# Patient Record
Sex: MALE | Race: WHITE | NOT HISPANIC OR LATINO | Employment: FULL TIME | URBAN - METROPOLITAN AREA
[De-identification: names, ages, dates, MRNs, and addresses within clinical notes are randomized per-mention and may not be internally consistent; named-entity substitution may affect disease eponyms.]

---

## 2018-10-17 ENCOUNTER — TELEPHONE (OUTPATIENT)
Dept: FAMILY MEDICINE | Facility: CLINIC | Age: 33
End: 2018-10-17

## 2018-10-17 NOTE — TELEPHONE ENCOUNTER
Doug Johnson is a 33 year old male who calls with pink eye concerns .    NURSING ASSESSMENT:  Description:  Works out side and eyes feel scratchy. Eyes are watery. Denies eye discharge, pinkness in the eyes, burning in eyes, fever, swelling.   Onset/duration:  This week  Precip. factors:  7.5 month old infant has pink eye, drops started Monday. Wife has pink eye.  Associated symptoms:  Eye concerns  Improves/worsens symptoms:  NEW  Pain scale (0-10)   0/10  Last exam/Treatment:  NEW  Allergies:   Allergies   Allergen Reactions     Penicillins      NURSING PLAN: Nursing advice to patient home care measures to prevent pink eye     RECOMMENDED DISPOSITION:  Home care advice   Will comply with recommendation: Yes  If further questions/concerns or if symptoms do not improve, worsen or new symptoms develop, call your PCP or [facility] as soon as possible.    NOTES:  Disposition was determined by the first positive assessment question, therefore all previous assessment questions were negative    Guideline used:  Telephone Triage Protocols for Nurses, Fifth Edition, Shanna Morocho  Eye problems  Nursing Judgment    Cecilia Espino, RN, BSN

## 2018-10-17 NOTE — TELEPHONE ENCOUNTER
Reason for Call:  Other call back    Detailed comments: Patient son was seen and dx with pink eye, he is wondering if he should get checked,  He has no symptoms.  Please call    Phone Number Patient can be reached at: Home number on file [phone] (home)    Best Time: any    Can we leave a detailed message on this number? YES    Call taken on 10/17/2018 at 10:40 AM by Caroline Geiger

## 2019-08-30 NOTE — PROGRESS NOTES
"SUBJECTIVE:   CC: Doug Johnson is an 34 year old male who presents for preventative health visit.     Healthy Habits:     Getting at least 3 servings of Calcium per day:  NO    Bi-annual eye exam:  Yes    Dental care twice a year:  Yes    Sleep apnea or symptoms of sleep apnea:  Sleep apnea    Diet:  Regular (no restrictions)    Frequency of exercise:  2-3 days/week    Duration of exercise:  15-30 minutes    Taking medications regularly:  Yes    Medication side effects:  Muscle aches    PHQ-2 Total Score: 1    Additional concerns today:  No    Biometric screening form needs to filled out- patient is Fasting today.     Borderline blood pressure   Has taken medications in the past- last a few years ago  Not watching salt/sodum in the diet  Exercise- \"chasing toddler around\"; landscaping up until new job. Runs 5k and has done marathons in the past but gotten out of good habits with young family.   Smoking- no tobacco. Marijuana \"occasionally\"  Father w/abdominal aortic aneurysm at age 65- emergency surgery. He smoked and had HTN. Paternal gma also had AAA.     Wife Shannon Johnson is my patient from [facility].   They have son Dieudonne and new baby girl Bertha borne over the weekend.   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     Hx of treatment for anxiety  Has taken medications- not for me.    Has done counseling  Attributes to stressful work environment. Changed his job.  Trying to get back to running.   Some mood sx- related to the job.   He reports he smokes marijuana now and again for stress. \"I know its your job to tell me not to but I could get a card if I wanted\"    Routine Health Maintenance:  Fasting: yes  Immunizations: wants tetanus and flu shot  Colonoscopy: Fam hx of colon cancer: no  PSA screening:  Fam hx of prostate cancer: no  Urinary concerns: no  Sexually active: yes- , monogamous. STD screening: not concerned. Will do HIV testing.        Today's PHQ-2 Score:   PHQ-2 " ( 1999 Pfizer) 9/4/2019   Q1: Little interest or pleasure in doing things 0   Q2: Feeling down, depressed or hopeless 1   PHQ-2 Score 1   Q1: Little interest or pleasure in doing things Not at all   Q2: Feeling down, depressed or hopeless Several days   PHQ-2 Score 1       Abuse: Current or Past(Physical, Sexual or Emotional)- No  Do you feel safe in your environment? Yes    Social History     Tobacco Use     Smoking status: Former Smoker     Packs/day: 0.50     Years: 5.00     Pack years: 2.50     Smokeless tobacco: Never Used   Substance Use Topics     Alcohol use: No     If you drink alcohol do you typically have >3 drinks per day or >7 drinks per week? No    No flowsheet data found.    Last PSA: No results found for: PSA    Reviewed orders with patient. Reviewed health maintenance and updated orders accordingly - Yes  Lab work is in process  Labs reviewed in EPIC  BP Readings from Last 3 Encounters:   09/04/19 135/85   04/02/12 129/82   12/26/11 131/86    Wt Readings from Last 3 Encounters:   09/04/19 93.9 kg (207 lb)   04/02/12 94.8 kg (209 lb)   12/26/11 92.9 kg (204 lb 12.8 oz)                  Patient Active Problem List   Diagnosis     CARDIOVASCULAR SCREENING; LDL GOAL LESS THAN 160     Family history of aortic aneurysm     History reviewed. No pertinent surgical history.    Social History     Tobacco Use     Smoking status: Former Smoker     Packs/day: 0.50     Years: 5.00     Pack years: 2.50     Smokeless tobacco: Never Used   Substance Use Topics     Alcohol use: No     Family History   Problem Relation Age of Onset     Abdominal Aortic Aneurysm Father 64     Abdominal Aortic Aneurysm Paternal Grandmother          Current Outpatient Medications   Medication Sig Dispense Refill     NO ACTIVE MEDICATIONS        Allergies   Allergen Reactions     Penicillins        Reviewed and updated as needed this visit by clinical staff         Reviewed and updated as needed this visit by Provider            Review of  "Systems   Constitutional: Negative for chills and fever.   HENT: Negative for congestion, ear pain, hearing loss and sore throat.    Eyes: Negative for pain and visual disturbance.   Respiratory: Negative for cough and shortness of breath.    Cardiovascular: Negative for chest pain, palpitations and peripheral edema.   Gastrointestinal: Negative for abdominal pain, constipation, diarrhea, heartburn, hematochezia and nausea.   Genitourinary: Negative for discharge, dysuria, frequency, genital sores, hematuria, impotence and urgency.   Musculoskeletal: Positive for myalgias. Negative for arthralgias and joint swelling.   Skin: Negative for rash.   Neurological: Negative for dizziness, weakness, headaches and paresthesias.   Psychiatric/Behavioral: Negative for mood changes. The patient is nervous/anxious.        OBJECTIVE:   /85   Pulse 74   Temp 98  F (36.7  C) (Oral)   Resp 12   Ht 1.778 m (5' 10\")   Wt 93.9 kg (207 lb)   BMI 29.70 kg/m    Physical Exam  GENERAL: healthy, alert and no distress  EYES: Eyes grossly normal to inspection, PERRL and conjunctivae and sclerae normal  HENT: ear canals and TM's normal, nose and mouth without ulcers or lesions  NECK: no adenopathy, no asymmetry, masses, or scars and thyroid normal to palpation  RESP: lungs clear to auscultation - no rales, rhonchi or wheezes  CV: regular rate and rhythm, normal S1 S2, no S3 or S4, no murmur, click or rub, no peripheral edema and peripheral pulses strong  ABDOMEN: soft, nontender, no hepatosplenomegaly, no masses and bowel sounds normal   (male): normal male genitalia without lesions or urethral discharge, no hernia  MS: no gross musculoskeletal defects noted, no edema  SKIN: no suspicious lesions or rashes  NEURO: Normal strength and tone, mentation intact and speech normal  PSYCH: mentation appears normal, affect normal/bright  LYMPH: normal ant/post cervical, supraclavicular nodes    Diagnostic Test Results:  Labs reviewed in " "Epic  Results for orders placed or performed in visit on 09/04/19 (from the past 24 hour(s))   Lipid panel reflex to direct LDL Fasting   Result Value Ref Range    Cholesterol 179 <200 mg/dL    Triglycerides 136 <150 mg/dL    HDL Cholesterol 45 >39 mg/dL    LDL Cholesterol Calculated 107 (H) <100 mg/dL    Non HDL Cholesterol 134 (H) <130 mg/dL   Glucose   Result Value Ref Range    Glucose 85 70 - 99 mg/dL       ASSESSMENT/PLAN:   1. Routine general medical examination at a health care facility  Fasting labs as below  Counseling as below  Pt to return for Tdap (new baby born this week) and influenza vaccine - not available in clinic today   - HIV Screening  - Lipid panel reflex to direct LDL Fasting  - Glucose    2. Encounter for biometric screening  Form to be completed when labs resulted  - Lipid panel reflex to direct LDL Fasting  - Glucose    3. Family history of aortic aneurysm  Family hx of ruptured aneurysm in father (survived) and ruptured in pat gma (fatal)  screening US  Counseled on risk reduction: discussed avoidance of tobacco and marijuana, BP management, weight management  - US Abdominal Aorta Imaging; Future    4. Elevated BP without diagnosis of hypertension  BP pre-HTN  Discussed low sodium diet  Weight management  No smoking  Recheck in 3-6mo     5. Screening for human immunodeficiency virus  Counseled on 1 time screening. Pt consented  - HIV Screening    6. Need for vaccination  Pt to return for Tdap and influenza     7. Situational stress  Counseled against marijuana use for \"stress\"  He is not interested in medication options for management  He is not interested in counseling  Discussed self cares- encouraged getting back to running which has helped him in the past  Avoid alcohol     8. Encounter for vasectomy counseling  Pt is interested in vasectomy.   He was introduced to  in clinic. May pursue with .   If he desires through urology as alternate, he can call for referral " "as well.       COUNSELING:   Reviewed preventive health counseling, as reflected in patient instructions       Regular exercise       Healthy diet/nutrition       Family planning       HIV screeninx in teen years, 1x in adult years, and at intervals if high risk    Estimated body mass index is 30.42 kg/m  as calculated from the following:    Height as of 12: 1.765 m (5' 9.5\").    Weight as of 12: 94.8 kg (209 lb).     Weight management plan: Discussed healthy diet and exercise guidelines     reports that he has quit smoking. He has never used smokeless tobacco.      Counseling Resources:  ATP IV Guidelines  Pooled Cohorts Equation Calculator  FRAX Risk Assessment  ICSI Preventive Guidelines  Dietary Guidelines for Americans, 2010  USDA's MyPlate  ASA Prophylaxis  Lung CA Screening    Shannon Oviedo PA-C  [facility]  "

## 2019-08-30 NOTE — PATIENT INSTRUCTIONS
Preventive Health Recommendations  Male Ages 26 - 39    Yearly exam:             See your health care provider every year in order to  o   Review health changes.   o   Discuss preventive care.    o   Review your medicines if your doctor has prescribed any.    You should be tested each year for STDs (sexually transmitted diseases), if you re at risk.     After age 35, talk to your provider about cholesterol testing. If you are at risk for heart disease, have your cholesterol tested at least every 5 years.     If you are at risk for diabetes, you should have a diabetes test (fasting glucose).  Shots: Get a flu shot each year. Get a tetanus shot every 10 years.     Nutrition:    Eat at least 5 servings of fruits and vegetables daily.     Eat whole-grain bread, whole-wheat pasta and brown rice instead of white grains and rice.     Get adequate Calcium and Vitamin D.     Lifestyle    Exercise for at least 150 minutes a week (30 minutes a day, 5 days a week). This will help you control your weight and prevent disease.     Limit alcohol to one drink per day.     No smoking.     Wear sunscreen to prevent skin cancer.     See your dentist every six months for an exam and cleaning.   ----  Dr.Shawn Chirinos - here at [facility] does vasectomy procedures. Can schedule an office visit with him to discuss the procedure.   To schedule your test or specialty referral please call: abdominal US  [facility]:  Radiology (imaging- mammogram, ultrasound, CT, MRI): [phone] [address]  [facility] [address]

## 2019-09-04 ENCOUNTER — OFFICE VISIT (OUTPATIENT)
Dept: FAMILY MEDICINE | Facility: CLINIC | Age: 34
End: 2019-09-04
Payer: COMMERCIAL

## 2019-09-04 ENCOUNTER — TELEPHONE (OUTPATIENT)
Dept: FAMILY MEDICINE | Facility: CLINIC | Age: 34
End: 2019-09-04

## 2019-09-04 VITALS
BODY MASS INDEX: 29.63 KG/M2 | SYSTOLIC BLOOD PRESSURE: 135 MMHG | RESPIRATION RATE: 12 BRPM | HEIGHT: 70 IN | DIASTOLIC BLOOD PRESSURE: 85 MMHG | HEART RATE: 74 BPM | WEIGHT: 207 LBS | TEMPERATURE: 98 F

## 2019-09-04 DIAGNOSIS — R03.0 ELEVATED BP WITHOUT DIAGNOSIS OF HYPERTENSION: ICD-10-CM

## 2019-09-04 DIAGNOSIS — Z00.8 ENCOUNTER FOR BIOMETRIC SCREENING: ICD-10-CM

## 2019-09-04 DIAGNOSIS — Z30.09 ENCOUNTER FOR VASECTOMY COUNSELING: ICD-10-CM

## 2019-09-04 DIAGNOSIS — Z11.4 SCREENING FOR HUMAN IMMUNODEFICIENCY VIRUS: ICD-10-CM

## 2019-09-04 DIAGNOSIS — Z82.49 FAMILY HISTORY OF AORTIC ANEURYSM: ICD-10-CM

## 2019-09-04 DIAGNOSIS — F43.9 SITUATIONAL STRESS: ICD-10-CM

## 2019-09-04 DIAGNOSIS — Z00.00 ROUTINE GENERAL MEDICAL EXAMINATION AT A HEALTH CARE FACILITY: Primary | ICD-10-CM

## 2019-09-04 DIAGNOSIS — Z23 NEED FOR VACCINATION: ICD-10-CM

## 2019-09-04 LAB
CHOLEST SERPL-MCNC: 179 MG/DL
GLUCOSE SERPL-MCNC: 85 MG/DL (ref 70–99)
HDLC SERPL-MCNC: 45 MG/DL
LDLC SERPL CALC-MCNC: 107 MG/DL
NONHDLC SERPL-MCNC: 134 MG/DL
TRIGL SERPL-MCNC: 136 MG/DL

## 2019-09-04 PROCEDURE — 80061 LIPID PANEL: CPT | Performed by: PHYSICIAN ASSISTANT

## 2019-09-04 PROCEDURE — 82947 ASSAY GLUCOSE BLOOD QUANT: CPT | Performed by: PHYSICIAN ASSISTANT

## 2019-09-04 PROCEDURE — 87389 HIV-1 AG W/HIV-1&-2 AB AG IA: CPT | Performed by: PHYSICIAN ASSISTANT

## 2019-09-04 PROCEDURE — 99213 OFFICE O/P EST LOW 20 MIN: CPT | Mod: 25 | Performed by: PHYSICIAN ASSISTANT

## 2019-09-04 PROCEDURE — 99385 PREV VISIT NEW AGE 18-39: CPT | Performed by: PHYSICIAN ASSISTANT

## 2019-09-04 PROCEDURE — 36415 COLL VENOUS BLD VENIPUNCTURE: CPT | Performed by: PHYSICIAN ASSISTANT

## 2019-09-04 ASSESSMENT — ENCOUNTER SYMPTOMS
PALPITATIONS: 0
PARESTHESIAS: 0
MYALGIAS: 1
CONSTIPATION: 0
WEAKNESS: 0
DIARRHEA: 0
ARTHRALGIAS: 0
FREQUENCY: 0
JOINT SWELLING: 0
HEMATOCHEZIA: 0
SORE THROAT: 0
DIZZINESS: 0
HEADACHES: 0
SHORTNESS OF BREATH: 0
CHILLS: 0
HEMATURIA: 0
EYE PAIN: 0
ABDOMINAL PAIN: 0
NAUSEA: 0
NERVOUS/ANXIOUS: 1
FEVER: 0
HEARTBURN: 0
DYSURIA: 0
COUGH: 0

## 2019-09-04 ASSESSMENT — PAIN SCALES - GENERAL: PAINLEVEL: MODERATE PAIN (5)

## 2019-09-04 ASSESSMENT — MIFFLIN-ST. JEOR: SCORE: 1895.2

## 2019-09-04 NOTE — LETTER
"September 5, 2019      Doug Johnson  [address] [place] [address]        Dear ,    We are writing to inform you of your test results.    Your test results fall within the expected range(s) or remain unchanged from previous results.  Please continue with current treatment plan.    Blood glucose (also called blood sugar), the screen for diabetes, was normal.    Your cholesterol profile is normal.     Getting regular exercise, eating more fruits and vegetables, less red meat, less fried foods, less \"sweets\", and keeping dairy in moderation are all things that can improve these numbers and reduce cardiovascular risk.     Interpreting your Cholesterol Profile Results: The LDL is the \"bad\" cholesterol that can clog the arteries.  The HDL is protective or \"good cholesterol\"; exercise and weight management can boost this level. Triglycerides are the sugary-fats in the blood. The ratio of HDL to total cholesterol under 5.0 is optimal.          Resulted Orders   HIV Screening   Result Value Ref Range    HIV Antigen Antibody Combo Nonreactive NR^Nonreactive          Comment:      HIV-1 p24 Ag & HIV-1/HIV-2 Ab Not Detected   Lipid panel reflex to direct LDL Fasting   Result Value Ref Range    Cholesterol 179 <200 mg/dL    Triglycerides 136 <150 mg/dL    HDL Cholesterol 45 >39 mg/dL    LDL Cholesterol Calculated 107 (H) <100 mg/dL      Comment:      Above desirable:  100-129 mg/dl  Borderline High:  130-159 mg/dL  High:             160-189 mg/dL  Very high:       >189 mg/dl      Non HDL Cholesterol 134 (H) <130 mg/dL      Comment:      Above Desirable:  130-159 mg/dl  Borderline high:  160-189 mg/dl  High:             190-219 mg/dl  Very high:       >219 mg/dl     Glucose   Result Value Ref Range    Glucose 85 70 - 99 mg/dL       If you have any questions or concerns, please call the clinic at the number listed above.       Sincerely,        Shannon Oviedo PA-C                "

## 2019-09-04 NOTE — TELEPHONE ENCOUNTER
Reason for call:  Form  Reason for Call:  Form, our goal is to have forms completed with 72 hours, however, some forms may require a visit or additional information.    Type of letter, form or note:  medical    Who is the form from?:  [employer]     Where did the form come from: form was faxed in    What clinic location was the form placed at?: [facility] - [phone]    Where the form was placed: [facility]    What number is listed as a contact on the form?:          Additional comments:  Fax to [phone]    created by Sandrita Huber

## 2019-09-05 LAB — HIV 1+2 AB+HIV1 P24 AG SERPL QL IA: NONREACTIVE

## 2019-09-06 ENCOUNTER — ALLIED HEALTH/NURSE VISIT (OUTPATIENT)
Dept: FAMILY MEDICINE | Facility: CLINIC | Age: 34
End: 2019-09-06
Payer: COMMERCIAL

## 2019-09-06 DIAGNOSIS — Z23 NEED FOR VACCINATION: Primary | ICD-10-CM

## 2019-09-06 DIAGNOSIS — Z23 NEED FOR PROPHYLACTIC VACCINATION AND INOCULATION AGAINST INFLUENZA: ICD-10-CM

## 2019-09-06 PROCEDURE — 90472 IMMUNIZATION ADMIN EACH ADD: CPT

## 2019-09-06 PROCEDURE — 90715 TDAP VACCINE 7 YRS/> IM: CPT

## 2019-09-06 PROCEDURE — 90686 IIV4 VACC NO PRSV 0.5 ML IM: CPT

## 2019-09-06 PROCEDURE — 90471 IMMUNIZATION ADMIN: CPT

## 2019-09-06 PROCEDURE — 99207 ZZC NO CHARGE NURSE ONLY: CPT

## 2019-09-06 NOTE — NURSING NOTE
Prior to immunization administration, verified patients identity using patient s name and date of birth. Please see Immunization Activity for additional information.     Screening Questionnaire for Adult Immunization    Are you sick today?   No   Do you have allergies to medications, food, a vaccine component or latex?   No   Have you ever had a serious reaction after receiving a vaccination?   No   Do you have a long-term health problem with heart disease, lung disease, asthma, kidney disease, metabolic disease (e.g. diabetes), anemia, or other blood disorder?   No   Do you have cancer, leukemia, HIV/AIDS, or any other immune system problem?   No   In the past 3 months, have you taken medications that affect  your immune system, such as prednisone, other steroids, or anticancer drugs; drugs for the treatment of rheumatoid arthritis, Crohn s disease, or psoriasis; or have you had radiation treatments?   No   Have you had a seizure, or a brain or other nervous system problem?   No   During the past year, have you received a transfusion of blood or blood     products, or been given immune (gamma) globulin or antiviral drug?   No   For women: Are you pregnant or is there a chance you could become        pregnant during the next month?   No   Have you received any vaccinations in the past 4 weeks?   No     Immunization questionnaire answers were all negative.        Per orders of , injection of Flu and TDAP given by Daylin Nuñez. Patient instructed to remain in clinic for 15 minutes afterwards, and to report any adverse reaction to me immediately.

## 2019-09-21 ENCOUNTER — ANCILLARY PROCEDURE (OUTPATIENT)
Dept: ULTRASOUND IMAGING | Facility: CLINIC | Age: 34
End: 2019-09-21
Attending: PHYSICIAN ASSISTANT
Payer: COMMERCIAL

## 2019-09-21 DIAGNOSIS — Z82.49 FAMILY HISTORY OF AORTIC ANEURYSM: ICD-10-CM

## 2019-09-21 PROCEDURE — 76775 US EXAM ABDO BACK WALL LIM: CPT | Performed by: RADIOLOGY

## 2019-09-23 ENCOUNTER — TELEPHONE (OUTPATIENT)
Dept: FAMILY MEDICINE | Facility: CLINIC | Age: 34
End: 2019-09-23

## 2019-09-23 NOTE — TELEPHONE ENCOUNTER
Left message asking patient to return call.  Please inform patient of RESULTS from Provider below.           Notes recorded by Shannon Oviedo PA-C on 9/23/2019 at 8:06 AM CDT  Please contact pt with the following message:    Ultrasound of the aorta is normal. No concerns.    Shannon Oviedo PA-C

## 2019-11-13 NOTE — PROGRESS NOTES
Subjective     SUBJECTIVE:  This 34 year old male is in for a vasectomy pre-procedure counseling.  He and his partner have decided they don't want to have any more children.  Patient was given information to read prior to the consultation as well.      We talked about the risks and benefits of the vasectomy; risks being that of potential for bleeding, infection, postoperative discomfort immediately which can last for a number of weeks afterwards, also the development of spermatoceles or sperm granulomas, epididymal cysts and the possibility of failure of the procedure producing failed attempt at sterility.     We also discussed there have been some past concerns about an increased risk of testicular or prostate cancer following a vasectomy, however several large, quality studies have found no increased risk of cancer.    We discussed the procedure in detail, how it is done, bilateral No-scalpel approach.  The vas deferens is brought up through this incision, dissected free and a small portion, at least 0.5 cm. in length is removed.  The prostatic end is cauterized to 1 cm and then the proximal or distal end is buried away from the other.  Patient had no questions when it came to the procedure itself.  I did show him pictures and diagrams of the procedure.  I showed him where a potential spermatocele or sperm granuloma can occur.      We discussed the post procedure course as well.  Patient will need to rest for 72 hours, preferably supine for most of the first day.  He should apply ice four time a day.  No physical training for 1 week, no sex for 1 week.  Will then need 20 ejaculations prior to a semen analysis eight weeks after the procedure.      OBJECTIVE:  On exam, this is a well-developed, well-nourished white male.  Weight is 207 pounds.  Height is 5 foot 10.    Exam reveals a normal circumcised penis, no lesions.  Testes are descended bilaterally.  Exam was limited to the genitalia.  Both vas deferens were  "easily identified.  No other abnormalities were noted.      ASSESSMENT:  Undesired fertility in an adult male, requesting vasectomy.    PLAN:  Signed consent obtained today.  His procedure appointment will be scheduled < 30 days from now.  He will  his procedure medications of Valium (2 tabs), Percocet (3 tabs), and Ibuprofen (30 tabs) and bring those to his appointment.   He will check in 45 minutes before his procedure and take 10mg Valium a 1 percocet tablet.  We discussed the post procedure course as well.  Patient will need to rest for 72 hours, preferably supine for most of the first day.  He should apply ice four time a day.  No physical training for 1 week, no sex for 1 week.  Will then need 20 ejaculations prior to a semen analysis eight weeks after the procedure.                 Assessment & Plan     1. Encounter for vasectomy counseling  Uncomplicated preoperative counseling and exam for vasectomy.  Discussed procedure, obtained consent.  Ordered preoperative medications.  Procedure scheduled in 2 days.  - ibuprofen (ADVIL/MOTRIN) 800 MG tablet; Take 1 tablet by mouth every eight hours with food as needed for pain starting 24 hours after your vasectomy  Dispense: 30 tablet; Refill: 0  - oxyCODONE-acetaminophen (PERCOCET) 5-325 MG tablet; Take 1 tablet by mouth 45 minutes before procedure, then take 1 tablet every 6 hours as needed for pain.  Dispense: 3 tablet; Refill: 0  - Semen Analysis Post Vasectomy; Future  - diazepam (VALIUM) 10 MG tablet; Take 1 tablet (10 mg) by mouth once for 1 dose Take 1 tablet be mouth 45 minutes before vasectomy procedure  Dispense: 1 tablet; Refill: 0     BMI:   Estimated body mass index is 29.7 kg/m  as calculated from the following:    Height as of this encounter: 1.778 m (5' 10\").    Weight as of this encounter: 93.9 kg (207 lb).           Follow-up in 2 days for vasectomy    Alec Chirinos MD  [facility]    Answers for HPI/ROS submitted by the " patient on 11/20/2019   Chronic problems general questions HPI Form  How many servings of fruits and vegetables do you eat daily?: 0-1  On average, how many sweetened beverages do you drink each day (soda, juice, sweet tea, etc)?: 1  How many days per week do you miss taking your medication?: 0

## 2019-11-19 NOTE — PROGRESS NOTES
"Subjective     Doug Johnson is a 34 year old male who presents to clinic today for the following health issues:    HPI   {SUPERLIST (Optional):063088}  {additonal problems for provider to add (Optional):347006}    {HIST REVIEW/ LINKS 2 (Optional):082316}    Reviewed and updated as needed this visit by Provider         Review of Systems   {ROS COMP (Optional):018753}      Objective    There were no vitals taken for this visit.  There is no height or weight on file to calculate BMI.  Physical Exam   {Exam List (Optional):503391}    {Diagnostic Test Results (Optional):939768::\"Diagnostic Test Results:\",\"Labs reviewed in Epic\"}        {PROVIDER CHARTING PREFERENCE:518298}    "

## 2019-11-20 ENCOUNTER — OFFICE VISIT (OUTPATIENT)
Dept: FAMILY MEDICINE | Facility: CLINIC | Age: 34
End: 2019-11-20
Payer: COMMERCIAL

## 2019-11-20 VITALS
WEIGHT: 207 LBS | DIASTOLIC BLOOD PRESSURE: 88 MMHG | TEMPERATURE: 98 F | RESPIRATION RATE: 18 BRPM | HEART RATE: 68 BPM | BODY MASS INDEX: 29.63 KG/M2 | HEIGHT: 70 IN | OXYGEN SATURATION: 98 % | SYSTOLIC BLOOD PRESSURE: 118 MMHG

## 2019-11-20 DIAGNOSIS — Z30.09 ENCOUNTER FOR VASECTOMY COUNSELING: Primary | ICD-10-CM

## 2019-11-20 PROCEDURE — 99213 OFFICE O/P EST LOW 20 MIN: CPT | Performed by: FAMILY MEDICINE

## 2019-11-20 RX ORDER — DIAZEPAM 10 MG
10 TABLET ORAL ONCE
Qty: 1 TABLET | Refills: 0 | Status: SHIPPED | OUTPATIENT
Start: 2019-11-20 | End: 2019-12-24

## 2019-11-20 RX ORDER — IBUPROFEN 800 MG/1
TABLET, FILM COATED ORAL
Qty: 30 TABLET | Refills: 0 | Status: SHIPPED | OUTPATIENT
Start: 2019-11-20 | End: 2019-12-24

## 2019-11-20 RX ORDER — OXYCODONE AND ACETAMINOPHEN 5; 325 MG/1; MG/1
TABLET ORAL
Qty: 3 TABLET | Refills: 0 | Status: SHIPPED | OUTPATIENT
Start: 2019-11-20 | End: 2019-12-24

## 2019-11-20 ASSESSMENT — MIFFLIN-ST. JEOR: SCORE: 1895.2

## 2019-11-21 NOTE — PATIENT INSTRUCTIONS
Vasectomy Aftercare Instructions  Keep the incision(s) dry until the next day; then you may shower.  Keep clean gauze over the area for three (3) days.  Some bruising, drainage and swelling are not unusual.  The scrotum and penis may turn  black and blue .  The edges of the incision may pull apart, and may heal rather slowly.  A  knot  may be present on each side for several months.  This is part of the normal healing process.  Please return directly to your home, and rest for at least 24 hours.  Applying ice (frozen peas) on the scrotum for 15 minutes four times a day may help to prevent swelling.  The day after surgery, you may walk and shower.  You may increase your activity on the third day, but vigorous exercise (jogging, basketball, etc.) is not advised until a week after your procedure. No heavy lifting or very strenuous activity for ten (10) days.  Wear tight underwear or athletic supporter for increased support and comfort during the next week.  When to seek medical care: Please call [phone].  After clinic hours, please go to the Emergency Department.    Increasing pain or swelling in your scrotum    bleeding    A large black-and-blue area, or a growing lump    Fever or chills    Increasing redness or drainage of the incision    Trouble urinating  It is recommended that you refrain from having sexual intercourse and ejaculation for one (1) week.  This is to permit the cut ends of the vas deferens to close before pressure is put on them.  Do not be surprised if your ejaculate contains some blood.  It is usually old blood, and nothing to worry about.  You will not be sterile for some time after the operation because the reservoirs may still contain live sperm.  Continue to use another method of birth control until you have had a sperm count, and have received a statement that sperm are no longer present in the semen.  At eight weeks or 20 ejaculations (whichever is later), please call our clinic at  [phone] to schedule a lab only appointment to drop off your semen sample.  Abstain from ejaculation for 72 hours prior to collection.  To collect the semen, wash your hands and clean the head of your penis.  Collect the specimen through masturbation into the provided sterile specimen container.  Keep the specimen cup warmed to body temperature (store next to skin) and take the specimen to the lab within 1 hour of collection.  We will call you with the results.  If there are no sperm in your sample, you will be effectively sterile.  If you have any concerns about any possible complications or any other questions, please call at once for advice. [phone]  Remember:  use another form of contraception until you have had your semen check(s) and your doctor gives the okay that you do not need to use other contraception.

## 2019-11-22 ENCOUNTER — OFFICE VISIT (OUTPATIENT)
Dept: FAMILY MEDICINE | Facility: CLINIC | Age: 34
End: 2019-11-22
Payer: COMMERCIAL

## 2019-11-22 VITALS
OXYGEN SATURATION: 98 % | HEART RATE: 63 BPM | TEMPERATURE: 97.7 F | SYSTOLIC BLOOD PRESSURE: 134 MMHG | DIASTOLIC BLOOD PRESSURE: 87 MMHG

## 2019-11-22 DIAGNOSIS — Z30.2 ENCOUNTER FOR VASECTOMY: Primary | ICD-10-CM

## 2019-11-22 PROCEDURE — 88302 TISSUE EXAM BY PATHOLOGIST: CPT | Performed by: FAMILY MEDICINE

## 2019-11-22 PROCEDURE — 55250 REMOVAL OF SPERM DUCT(S): CPT | Performed by: FAMILY MEDICINE

## 2019-11-22 PROCEDURE — 99207 ZZC DROP WITH A PROCEDURE: CPT | Performed by: FAMILY MEDICINE

## 2019-11-22 NOTE — PROGRESS NOTES
PRE-OP DIAGNOSIS: Desires Elective Sterilization   POST-OP DIAGNOSIS: Same   PROCEDURE: Elective Bilateral Vasectomy    Physician: Dr. Alec Chirinos MD  RN: Jewels Blackwell  ANESTHESIA: Lidocaine 1% without Epi  Total amount used: 5 mL      INDICATIONS:   The patient desires elective sterilization. He was counseled regarding the risks, alternatives, and benefits of male sterilization by vasectomy. He was informed of the risks of the procedure, including but not limited to failure of the procedure to produce sterility, the risks of bleeding, infection, and injury to scrotal contents. All questions were answered in the pre-vasectomy visit and the consent form was signed within the past 30 days. A time out was taken prior to the procedure.      PROCEDURE:   The patient was laid supine on the procedure table. He was sterilely prepped and draped in the usual fashion. The vasa were identified bilaterally. The right vas was grasped using the three-finger technique. Local anesthesia with a 27 gauge needle was applied to the skin in the lateral scrotum and to the right vas and surrounding tissue. A vas fixing forcep was used to grasp the vas through the scrotal skin. A vas dissecting instrument was then used to zaidi the skin and down through the fascia. The vas was then identified, bluntly dissected and delivered through the incision. The vas was then transected and the prostatic end lumen was ablated using thermal cautery to 1cm.  The testicular end was transected and a 1cm segment was removed and placed in a specimen cup.  The prostatic and was buried using chromic suture.  Hemostasis was confirmed prior to reinserting the vas in to the scrotum.       The left vas was attended to in the same fashion as the right vas after local anesthesia was applied to the vas and surrounding tissue. All bleeding was controlled. The scrotal fascia was allowed to close by primary intention. Bilateral vas segments were sent to the lab  for verification.  Sterile dressings and supportive underwear were applied and the patient was sent home with standard post-vasectomy instructions, including wound care instructions, activity limitations (no sex for one week, no exercise for 1 week, use of contraception until cleared by semen analysis), instructions to return for a post-vas semen analysis after 20+ ejaculations in 6-8 weeks.    (Z30.2) Encounter for vasectomy  (primary encounter diagnosis)  Comment: Patient tolerated procedure well was discharged in stable follow-up instructions.  Plan: Surgical pathology exam, VASECTOMY UNILAT/BILAT        W POSTOP SEMEN        Postop semen analysis was ordered previously, specimen sent for pathology.    Alec Chirinos MD, FAAFP  Family Medicine Physician  [facility] [place]  [address] [place] [id number]

## 2019-11-26 LAB — COPATH REPORT: NORMAL

## 2019-12-24 ENCOUNTER — OFFICE VISIT (OUTPATIENT)
Dept: FAMILY MEDICINE | Facility: CLINIC | Age: 34
End: 2019-12-24
Payer: COMMERCIAL

## 2019-12-24 VITALS
HEART RATE: 76 BPM | RESPIRATION RATE: 16 BRPM | DIASTOLIC BLOOD PRESSURE: 88 MMHG | BODY MASS INDEX: 29.92 KG/M2 | SYSTOLIC BLOOD PRESSURE: 118 MMHG | TEMPERATURE: 98 F | OXYGEN SATURATION: 97 % | WEIGHT: 209 LBS | HEIGHT: 70 IN

## 2019-12-24 DIAGNOSIS — J06.9 VIRAL URI WITH COUGH: Primary | ICD-10-CM

## 2019-12-24 DIAGNOSIS — Q17.9 EAR ANOMALY: ICD-10-CM

## 2019-12-24 PROCEDURE — 99213 OFFICE O/P EST LOW 20 MIN: CPT | Performed by: FAMILY MEDICINE

## 2019-12-24 RX ORDER — BENZONATATE 100 MG/1
100 CAPSULE ORAL 3 TIMES DAILY PRN
Qty: 15 CAPSULE | Refills: 0 | Status: SHIPPED | OUTPATIENT
Start: 2019-12-24 | End: 2021-06-07

## 2019-12-24 ASSESSMENT — MIFFLIN-ST. JEOR: SCORE: 1904.27

## 2019-12-24 NOTE — PROGRESS NOTES
Subjective     Doug Johnson is a 34 year old male who presents to clinic today for the following health issues:    HPI   Acute Illness   Acute illness concerns: couggh  Onset: 1 week     Fever: no     Chills/Sweats: YES.both.    Headache (location?): YES    Sinus Pressure:YES    Conjunctivitis:  no    Ear Pain: no    Rhinorrhea: YES    Congestion: YES    Sore Throat: YES     Cough: YES. productive breath.     Wheeze: no     Decreased Appetite: YES    Nausea: no     Vomiting: no     Diarrhea:  no     Dysuria/Freq.: no     Fatigue/Achiness: YES.both.    Sick/Strep Exposure: YES. Kids in .      Therapies Tried and outcome: Zantac, cough drops , and Emergency C.        Patient Active Problem List   Diagnosis     CARDIOVASCULAR SCREENING; LDL GOAL LESS THAN 160     Family history of aortic aneurysm     Elevated BP without diagnosis of hypertension     History reviewed. No pertinent surgical history.    Social History     Tobacco Use     Smoking status: Former Smoker     Packs/day: 0.50     Years: 5.00     Pack years: 2.50     Smokeless tobacco: Never Used   Substance Use Topics     Alcohol use: No     Family History   Problem Relation Age of Onset     Abdominal Aortic Aneurysm Father 64     Abdominal Aortic Aneurysm Paternal Grandmother          Current Outpatient Medications   Medication Sig Dispense Refill     benzonatate (TESSALON) 100 MG capsule Take 1 capsule (100 mg) by mouth 3 times daily as needed for cough 15 capsule 0     NO ACTIVE MEDICATIONS        Allergies   Allergen Reactions     Penicillins      Recent Labs   Lab Test 09/04/19  1009   *   HDL 45   TRIG 136      BP Readings from Last 3 Encounters:   12/24/19 118/88   11/22/19 134/87   11/20/19 118/88    Wt Readings from Last 3 Encounters:   12/24/19 94.8 kg (209 lb)   11/20/19 93.9 kg (207 lb)   09/04/19 93.9 kg (207 lb)            Reviewed and updated as needed this visit by Provider         Review of Systems   ROS COMP: Constitutional,  "HEENT, cardiovascular, pulmonary, gi and gu systems are negative, except as otherwise noted.      Objective    /88   Pulse 76   Temp 98  F (36.7  C) (Oral)   Resp 16   Ht 1.778 m (5' 10\")   Wt 94.8 kg (209 lb)   SpO2 97%   BMI 29.99 kg/m    Body mass index is 29.99 kg/m .  Physical Exam   GENERAL: healthy, alert and no distress  EYES: Eyes grossly normal to inspection, PERRL and conjunctivae and sclerae normal  HENT: ear canals and TM's normal, nose and mouth without ulcers or lesions  NECK: no adenopathy, no asymmetry, masses, or scars and thyroid normal to palpation  RESP: lungs clear to auscultation - no rales, rhonchi or wheezes  CV: regular rate and rhythm, normal S1 S2, no S3 or S4, no murmur, click or rub, no peripheral edema and peripheral pulses strong  MS: no gross musculoskeletal defects noted, no edema  SKIN: no suspicious lesions or rashes and approximately 8 mm persistent holes bilateral lower earlobe  NEURO: Normal strength and tone, mentation intact and speech normal  BACK: no CVA tenderness, no paralumbar tenderness  PSYCH: mentation appears normal, affect normal/bright    Diagnostic Test Results:  Labs reviewed in Epic        ASSESSMENT and PLAN  1. Viral URI with cough  34-year-old male with common cold symptoms.  Examination today was reassuring.  Discussed common cold, will treat cough with Tessalon, recommended rest, good hydration, healthy diet.  Anticipate to need improvement over the coming days.  Follow-up no improvement or any worsening.  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  He would like to have these surgically corrected.  Referral placed to plastic surgery.  - PLASTIC SURGERY REFERRAL         Return in about 6 months (around 6/24/2020) for Annual Well Check.     Alec Chirinos, " MD, FAAFP  Family Medicine Physician  [facility] [place]  [address], [place] [address]

## 2019-12-24 NOTE — PATIENT INSTRUCTIONS
Patient Education     Viral Upper Respiratory Illness (Adult)    You have a viral upper respiratory illness (URI), which is another term for the common cold. This illness is contagious during the first few days. It is spread through the air by coughing and sneezing. It may also be spread by direct contact (touching the sick person and then touching your own eyes, nose, or mouth). Frequent handwashing will decrease risk of spread. Most viral illnesses go away within 7 to 10 days with rest and simple home remedies. Sometimes the illness may last for several weeks. Antibiotics will not kill a virus, and they are generally not prescribed for this condition.  Home care    If symptoms are severe, rest at home for the first 2 to 3 days. When you resume activity, don't let yourself get too tired.    Don't smoke. If you need help stopping, talk with your healthcare provider.    Avoid being exposed to cigarette smoke (yours or others ).    You may use acetaminophen or ibuprofen to control pain and fever, unless another medicine was prescribed. If you have chronic liver or kidney disease, have ever had a stomach ulcer or gastrointestinal bleeding, or are taking blood-thinning medicines, talk with your healthcare provider before using these medicines. Aspirin should never be given to anyone under 18 years of age who is ill with a viral infection or fever. It may cause severe liver or brain damage.    Your appetite may be poor, so a light diet is fine. Stay well hydrated by drinking 6 to 8 glasses of fluids per day (water, soft drinks, juices, tea, or soup). Extra fluids will help loosen secretions in the nose and lungs.    Over-the-counter cold medicines will not shorten the length of time you re sick, but they may be helpful for the following symptoms: cough, sore throat, and nasal and sinus congestion. If you take prescription medicines, ask your healthcare provider or pharmacist which over-the-counter medicines are safe to  use. (Note: Don't use decongestants if you have high blood pressure.)  Follow-up care  Follow up with your healthcare provider, or as advised.  When to seek medical advice  Call your healthcare provider right away if any of these occur:    Cough with lots of colored sputum (mucus)    Severe headache; face, neck, or ear pain    Difficulty swallowing due to throat pain    Fever of 100.4 F (38 C) or higher, or as directed by your healthcare provider  Call 911  Call 911 if any of these occur:    Chest pain, shortness of breath, wheezing, or difficulty breathing    Coughing up blood    Very severe pain with swallowing, especially if it goes along with a muffled voice   Date Last Reviewed: 6/1/2018 2000-2018 [employer]. [address] [place] [address], [place] [address]. All rights reserved. This information is not intended as a substitute for professional medical care. Always follow your healthcare professional's instructions.         Doug,    It was great seeing you in clinic today.  I summarized our discussion and your plan below.  Please let me know if you have any questions or concerns.  Please follow up with me as discussed in clinic or sooner if any worsening or additional concerns.     1. Viral URI with cough  34-year-old male with common cold symptoms.  Examination today was reassuring.  Discussed common cold, will treat cough with Tessalon, recommended rest, good hydration, healthy diet.  Anticipate to need improvement over the coming days.  Follow-up no improvement or any worsening.  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  He would like to have these surgically corrected.  Referral placed to plastic surgery.  - PLASTIC SURGERY REFERRAL       Sincerely,  Dr. KATHI Chirinos MD, FAAFP  Family Medicine  Physician  [facility] [place]  [address] [place] [address]    Patient Education

## 2020-02-17 DIAGNOSIS — Z30.09 ENCOUNTER FOR VASECTOMY COUNSELING: ICD-10-CM

## 2020-02-17 LAB — SEMEN ANALYSIS P VAS PNL: NORMAL

## 2020-02-17 PROCEDURE — 89321 SEMEN ANAL SPERM DETECTION: CPT | Performed by: FAMILY MEDICINE

## 2020-02-17 NOTE — RESULT ENCOUNTER NOTE
Please advise patient that his recent semen analysis showed no sperm.  This means the vasectomy was successful and he is effectively sterile.    Alec Chirinos MD, FAAFP  Family Medicine Physician  [facility] [place]  [address] [place] [address]

## 2020-03-12 ENCOUNTER — TELEPHONE (OUTPATIENT)
Dept: SURGERY | Facility: CLINIC | Age: 35
End: 2020-03-12

## 2020-03-12 NOTE — TELEPHONE ENCOUNTER
PREVISIT INFORMATION                                                    Doug Johnson scheduled for future visit at [facility] specialty clinics.    Patient is scheduled to see Dr. Zhu on 3/13/20  Reason for visit: ear anomaly  Referring provider Alec Chirinos MD  Has patient seen previous specialist? No  Medical Records:  Available in chart.  Patient was previously seen at a [facility] or [facility] facility.    REVIEW                                                      New patient packet mailed to patient: No  Medication reconciliation complete: No      Current Outpatient Medications   Medication Sig Dispense Refill     benzonatate (TESSALON) 100 MG capsule Take 1 capsule (100 mg) by mouth 3 times daily as needed for cough 15 capsule 0     NO ACTIVE MEDICATIONS          Allergies: Penicillins        PLAN/FOLLOW-UP NEEDED                                                      Previsit review complete.  Patient will see provider at future scheduled appointment.     Patient Reminders Given:  Please, make sure you bring an updated list of your medications.   If you are having a procedure, please, present 15 minutes early.  If you need to cancel or reschedule,please call [phone].    Kristen Nicolas LPN

## 2020-04-03 ENCOUNTER — TELEPHONE (OUTPATIENT)
Dept: SURGERY | Facility: CLINIC | Age: 35
End: 2020-04-03

## 2020-04-03 NOTE — TELEPHONE ENCOUNTER
Writer called and spoke with patient to reschedule due to Covid-19. Patient rescheduled to 6/5/20 at 4:30pm.    Kristen Nicolas LPN

## 2020-04-11 ENCOUNTER — VIRTUAL VISIT (OUTPATIENT)
Dept: FAMILY MEDICINE | Facility: OTHER | Age: 35
End: 2020-04-11

## 2020-04-11 NOTE — PROGRESS NOTES
"Date: 2020 13:21:52  Clinician: Rene Evans  Clinician NPI: [id number]  Patient: Doug Johnson  Patient : 1985  Patient Address: [address] [place] [address]  Patient Phone: [phone]  Visit Protocol: URI  Patient Summary:  Doug is a 34 year old ( : 1985 ) male who initiated a Visit for COVID-19 (Coronavirus) evaluation and screening. When asked the question \"Please sign me up to receive news, health information and promotions from [employer].\", Doug responded \"No\".    Doug states his symptoms started 1-2 days ago.   His symptoms consist of rhinitis, facial pain or pressure, myalgia, enlarged lymph nodes, chills, a headache, a sore throat, a cough, malaise, and ear pain. Doug also feels feverish.   Symptom details     Nasal secretions: The color of his mucus is clear.    Cough: Doug coughs a few times an hour and his cough is not more bothersome at night. Phlegm does not come into his throat when he coughs. He does not believe his cough is caused by post-nasal drip.     Sore throat: Doug reports having severe throat pain (7-9 on a 10 point pain scale), does not have exudate on his tonsils, and can swallow liquids. The lymph nodes in his neck are enlarged. A rash has not appeared on the skin since the sore throat started.     Temperature: His current temperature is 98.3 degrees Fahrenheit.     Facial pain or pressure: The facial pain or pressure does not feel worse when bending or leaning forward.     Headache: He states the headache is moderate (4-6 on a 10 point pain scale).      Doug denies having diarrhea, vomiting, nausea, teeth pain, wheezing, and nasal congestion. He also denies taking antibiotic medication for the symptoms, having recent facial or sinus surgery in the past 60 days, and having a sinus infection within the past year. He is not experiencing dyspnea.   Precipitating events  Within the past week, Doug has not been exposed to someone with strep throat. He " has not recently been exposed to someone with influenza. Doug has been in close contact with the following high risk individuals: children under the age of 5.   Pertinent COVID-19 (Coronavirus) information  Doug has not traveled internationally or to the areas where COVID-19 (Coronavirus) is widespread, including cruise ship travel in the last 14 days before the start of his symptoms.   Doug does not work or volunteer as healthcare worker or a  and does not work or volunteer in a healthcare facility.   He does not live with a healthcare worker.   Doug has not had a close contact with a laboratory-confirmed COVID-19 patient within 14 days of symptom onset. He also has not had a close contact with a suspected COVID-19 patient within 14 days of symptom onset.   Pertinent medical history  Doug needs a return to work/school note.   Weight: 197 lbs   Doug does not smoke or use smokeless tobacco.   Weight: 197 lbs    MEDICATIONS: Tylenol Extra Strength oral, ALLERGIES: NKDA  Clinician Response:  Dear Doug,   Based on the information you have provided, you do have symptoms that are consistent with Coronavirus (COVID-19).  The coronavirus causes mild to severe respiratory illness with the most common symptoms including fever, cough and difficulty breathing. Unfortunately, many viruses cause similar symptoms and it can be difficult to distinguish between viruses, especially in mild cases, so we are presuming that anyone with cough or fever has coronavirus at this time.  Coronavirus/COVID-19 has reached the point of community spread in [place], meaning that we are finding the virus in people with no known exposure risk for rizwan the virus. Given the increasing commonness of coronavirus in the community we are no longer testing patients who are not critically ill.  If you are a health care worker, you should refer to your employee health office for instructions about testing and returning to work.   For everyone else who has cough or fever, you should assume you are infected with coronavirus. Since you will not be tested but have symptoms that may be consistent with coronavirus, the CDC recommends you stay in self-isolation until these three things have happened:    You have had no fever for at least 72 hours (that is three full days of no fever without the use of medicine that reduces fevers)    AND   Other symptoms have improved (for example, when your cough or shortness of breath have improved)   AND   At least 7 days have passed since your symptoms first appeared.   How to Isolate:   Isolate yourself at home.  Do Not allow any visitors  Do Not go to work or school  Do Not go to [facility],  centers, shopping, or other public places.  Do Not shake hands.  Avoid close contact with others (hugging, kissing).   Protect Others:   Cover Your Mouth and Nose with a mask, disposable tissue or wash cloth to avoid spreading germs to others.  Wash your hands and face frequently with soap and water.   We know it can be scary to hear that you might have COVID-19. Our team can help track your symptoms and make sure you are doing ok over the next two weeks using a program called [employer] to keep in touch. When you receive an email from [employer], please consider enrolling in our monitoring program. There is no cost to you for monitoring. Here is a URL where you can learn more: [email or web address]  Managing Symptoms:   At this time, we primarily recommend Tylenol (Acetaminophen) for fever or pain. If you have liver or kidney problems, contact your primary care provider for instructions on use of tylenol. Adults can take 650 mg (two 325 mg pills) by mouth every 4-6 hours as needed OR 1,000 mg (two 500 mg pills) every 8 hours as needed. MAXIMUM DAILY DOSE: 3,000mg. For children, refer to dosing on bottle based on age or weight.   If you develop significant shortness of breath that prevents you from  doing normal activities, please call 911 or proceed to the nearest emergency room and alert them immediately that you have been in self-isolation for possible coronavirus.  If you have a higher risk medical condition such as cancer, heart failure, end stage renal disease on dialysis or have a transplant, please reach out to your specialist's clinic to advise them of your [facility] visit should you not improve within the next two days.   For more information about COVID19 and options for caring for yourself at home, please visit the CDC website at https://www.cdc.gov/coronavirus/2019-ncov/about/steps-when-sick.htmlFor more options for care at [facility], please visit our website at [email or web address]    Diagnosis: Cough  Diagnosis ICD: R05

## 2020-06-05 ENCOUNTER — VIRTUAL VISIT (OUTPATIENT)
Dept: SURGERY | Facility: CLINIC | Age: 35
End: 2020-06-05
Payer: COMMERCIAL

## 2020-06-05 DIAGNOSIS — S01.319D: Primary | ICD-10-CM

## 2020-06-05 PROCEDURE — 99203 OFFICE O/P NEW LOW 30 MIN: CPT | Mod: 95 | Performed by: PLASTIC SURGERY

## 2020-06-05 NOTE — LETTER
"    6/5/2020         RE: Doug Johnson  [address]  [place] [address]        Dear Colleague,    Thank you for referring your patient, Doug Johnson, to the [facility]. Please see a copy of my visit note below.    Doug Johnson is a 35 year old male who is being evaluated via a billable video visit.      The patient has been notified of following:     \"This video visit will be conducted via a call between you and your physician/provider. We have found that certain health care needs can be provided without the need for an in-person physical exam.  This service lets us provide the care you need with a video conversation.  If a prescription is necessary we can send it directly to your pharmacy.  If lab work is needed we can place an order for that and you can then stop by our lab to have the test done at a later time.    Video visits are billed at different rates depending on your insurance coverage.  Please reach out to your insurance provider with any questions.    If during the course of the call the physician/provider feels a video visit is not appropriate, you will not be charged for this service.\"    Patient has given verbal consent for Video visit? Yes    How would you like to obtain your AVS? MyChart    Patient would like the video invitation sent by: Text to cell phone: [phone]    Will anyone else be joining your video visit? No      Kristen Nicolas LPN  CONSULTATION NOTE      REFERRING PROVIDER:  Alec Chirinos MD       PRESENTING COMPLAINT:  Consultation for bilateral ear lobule large holes from previous earrings.      HISTORY OF PRESENTING COMPLAINT:  Mr. Johnson is 35 years old.  He wants the holes in his ear lobes repaired.      PAST MEDICAL HISTORY:  Nil.      PAST SURGICAL HISTORY:  Nil.      MEDICATIONS:  Nil.      ALLERGIES:  Penicillin, rash.      SOCIAL HISTORY:  Used to smoke half-pack a day for about 5 years, quit in 2017.  Works in an auto body shop.  Lives in [facility] [place].      REVIEW OF SYSTEMS:  Denies chest pain, shortness of breath, MI, CVA, DVT and PE.      PHYSICAL EXAMINATION:     VITAL SIGNS:  Stable.  He is afebrile, in no obvious distress.  He is 5 feet 10 inches, 209 pounds, BMI of 30 kg/m2.     HEENT:  On examination of both earlobes, he has about 8 mm x 8 mm diameter holes on each side.      ASSESSMENT AND PLAN:  Based on above findings, a diagnosis of holes in ear lobes from earrings was made.  Explained to him how this will be fixed.  It would require scars.  All risks, benefits and alternatives including pain, infection, bleeding, scarring, asymmetry, seromas, hematomas, wound breakdown, wound dehiscence, prominent scar, hypertrophic scar, keloid scar, asymmetries of the ear lobes infections, numbness, DVT, PE, MI, CVA and death were explained.  We can do this in clinic at [place].  Will need prior authorization.  Advised to send photographs through [employer] so as to get prior authorization.  If denied, quotes will be given to him.  He understood.  Look forward to helping her out in the near future.      Total time spent with patient was 30 minutes, more than half counseling.       cc:   Alec Chirinos MD    [facility]   [id number] [place] [address] [place] [address]             Video-Visit Details    Type of service:  Video Visit    Video Start Time: 1430  Video End Time: 1500    Originating Location (pt. Location): Home    Distant Location (provider location):  [facility]     Platform used for Video Visit: Tyrell Zhu MD        Again, thank you for allowing me to participate in the care of your patient.        Sincerely,        BINA Zhu MD

## 2020-06-05 NOTE — PROGRESS NOTES
"Doug Johnson is a 35 year old male who is being evaluated via a billable video visit.      The patient has been notified of following:     \"This video visit will be conducted via a call between you and your physician/provider. We have found that certain health care needs can be provided without the need for an in-person physical exam.  This service lets us provide the care you need with a video conversation.  If a prescription is necessary we can send it directly to your pharmacy.  If lab work is needed we can place an order for that and you can then stop by our lab to have the test done at a later time.    Video visits are billed at different rates depending on your insurance coverage.  Please reach out to your insurance provider with any questions.    If during the course of the call the physician/provider feels a video visit is not appropriate, you will not be charged for this service.\"    Patient has given verbal consent for Video visit? Yes    How would you like to obtain your AVS? [facility]    Patient would like the video invitation sent by: Text to cell phone: [phone]    Will anyone else be joining your video visit? No      Kristen Nicolas LPN  CONSULTATION NOTE      REFERRING PROVIDER:  Alec Chirinos MD       PRESENTING COMPLAINT:  Consultation for bilateral ear lobule large holes from previous earrings.      HISTORY OF PRESENTING COMPLAINT:  Mr. Johnson is 35 years old.  He wants the holes in his ear lobes repaired.      PAST MEDICAL HISTORY:  Nil.      PAST SURGICAL HISTORY:  Nil.      MEDICATIONS:  Nil.      ALLERGIES:  Penicillin, rash.      SOCIAL HISTORY:  Used to smoke half-pack a day for about 5 years, quit in 2017.  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      REVIEW OF SYSTEMS:  Denies chest pain, shortness of breath, MI, CVA, DVT and PE.      PHYSICAL EXAMINATION:     VITAL SIGNS:  Stable.  He is afebrile, in no obvious distress.  He is 5 feet 10 inches, 209 pounds, BMI of 30 " kg/m2.     HEENT:  On examination of both earlobes, he has about 8 mm x 8 mm diameter holes on each side.      ASSESSMENT AND PLAN:  Based on above findings, a diagnosis of holes in ear lobes from earrings was made.  Explained to him how this will be fixed.  It would require scars.  All risks, benefits and alternatives including pain, infection, bleeding, scarring, asymmetry, seromas, hematomas, wound breakdown, wound dehiscence, prominent scar, hypertrophic scar, keloid scar, asymmetries of the ear lobes infections, numbness, DVT, PE, MI, CVA and death were explained.  We can do this in clinic at [place].  Will need prior authorization.  Advised to send photographs through [employer] so as to get prior authorization.  If denied, quotes will be given to him.  He understood.  Look forward to helping her out in the near future.      Total time spent with patient was 30 minutes, more than half counseling.       cc:   Alec Chirinos MD    [facility]   [address] [place] [address]    [place] [address]             Video-Visit Details    Type of service:  Video Visit    Video Start Time: 1430  Video End Time: 1500    Originating Location (pt. Location): Home    Distant Location (provider location):  [facility]     Platform used for Video Visit: Tyrell Zhu MD

## 2020-06-09 ENCOUNTER — TELEPHONE (OUTPATIENT)
Dept: SURGERY | Facility: CLINIC | Age: 35
End: 2020-06-09

## 2020-06-09 NOTE — TELEPHONE ENCOUNTER
I've reached out to Danyelle Coombs for the correct cpt codes to submit to insurance so we are reimbursed properly by patient's insurance. Once informed we will submit PA or inform you if patient is good to go for scheduling.     eldon

## 2020-06-09 NOTE — TELEPHONE ENCOUNTER
----- Message -----   From: BINA Zhu MD   Sent: 6/5/2020   2:55 PM CDT   To: Juju Roberts RN, Kristen Nicolas LPN, *   Subject: Update                                           Hi all,     Please send PA for bilateral torn earlobe repair, local in clinic. He will upload pictures in [employer].     If denied will need quotes     If approved please schedule in [facility] clinic for repair, 30 mins     Thanks     Hipolito

## 2020-06-10 NOTE — TELEPHONE ENCOUNTER
Spoke to Joanne Zamudio, at [facility], these codes are all billable and no auth or pre-d is needed on this account. Call ref.# [id number] 6/10/2020 8:24am    12051-Repair, intermediate, wounds of face, ears, eyelids, nose, lips and/or mucous membranes; 2.5 cm or less  59338- Repair, intermediate, wounds of face, ears, eyelids, nose, lips and/or mucous membranes; 2.6 cm to 5.0 cm  35991- Repair, intermediate, wounds of face, ears, eyelids, nose, lips and/or mucous membranes; 5.1 cm to 7.5 cm   ICD10: S01.319D Tear of earlobe, subsequent encounter

## 2020-06-10 NOTE — TELEPHONE ENCOUNTER
Pt called and notified of message below. Pt states that he  would like to schedule but had some financial questions to ask. Pt scheduled and FC phone number given to pt....Juju Roberts RN

## 2020-07-11 DIAGNOSIS — Z11.59 SCREENING FOR VIRAL DISEASE: ICD-10-CM

## 2020-07-24 ENCOUNTER — OFFICE VISIT (OUTPATIENT)
Dept: SURGERY | Facility: CLINIC | Age: 35
End: 2020-07-24
Payer: COMMERCIAL

## 2020-07-24 VITALS — HEART RATE: 67 BPM | DIASTOLIC BLOOD PRESSURE: 84 MMHG | SYSTOLIC BLOOD PRESSURE: 135 MMHG

## 2020-07-24 DIAGNOSIS — S01.319D: Primary | ICD-10-CM

## 2020-07-24 PROCEDURE — 12013 RPR F/E/E/N/L/M 2.6-5.0 CM: CPT | Performed by: PLASTIC SURGERY

## 2020-07-24 NOTE — NURSING NOTE
Doug Johnson's goals for this visit include:   Chief Complaint   Patient presents with     Procedure     bilateral earlobe repair       He requests these members of his care team be copied on today's visit information: no    PCP: Shannon Oviedo    Referring Provider:  No referring provider defined for this encounter.    There were no vitals taken for this visit.    Do you need any medication refills at today's visit? No    Kristen Nicolas LPN

## 2020-07-24 NOTE — PROGRESS NOTES
PRESENTING COMPLAINT:  Followup visit for earlobe repair.      HISTORY OF PRESENTING COMPLAINT:  Mr. Johnson is 35 years old, here to have his ear lobes repaired.  He has wide earring opening and would like that closed.  No change in history and physical exam.  All risks, benefits and alternatives of the procedure were explained.  He understood them all and wants to proceed.      PROCEDURE NOTE:  After informed consent was taken from the patient, the proper site and procedure was ascertained with him, he was appropriately marked and taken to the procedure room.  Both ears and neck area were prepped and draped in standard surgical fashion.  Lidocaine 1% was injected in the mastoid region and in the upper neck region to get a regional block.  We then went ahead and excised the ring of skin in the opening on each side.  I then closed the openings in a manner that created a normal-looking earlobe and then excised some of the standing cutaneous cones on each side and then closed them in a simple fashion using 5-0 Prolene suture.  Total length of each side was about 2 cm, simple closure.  A dressing was placed on top.      FOLLOWUP:  We will see him back in 2 weeks to remove the sutures.      Total time spent with patient was 30 minutes, of which more than half was counseling and procedure.

## 2020-07-24 NOTE — PATIENT INSTRUCTIONS
Excision Wound Care Instructions  I will experience scar, altered skin color, bleeding, swelling, pain, crusting and redness. I may experience altered sensation. Risks are excessive bleeding, infection, muscle weakness, thick (hypertrophic or keloidal) scar, and recurrence,. A second procedure may be recommended to obtain the best cosmetic or functional result.  After your surgery, Dermabond may be placed over the area that has sutures and a dry dressing. Please follow these instructions until you come back to clinic for suture removal on 8/7/20, as they will help you to prevent complications as your wound heals.  For the First 48 hours After Surgery:  1. If  pressure bandage used keep it on and keep it dry. If it should come loose, you may retape it, but do not take it off.  2. Relax and take it easy. Do not do any vigorous exercise, heavy lifting, or bending forward. This could cause the wound to bleed.  3. Post-operative pain is usually mild. You may take plain or extra strength Tylenol every 4 hours as needed (do not take more than 4,000mg in one day). Do not take any medicine that contains aspirin, ibuprofen or motrin unless you have been recommended these by a doctor.  Avoid alcohol and vitamin E as these may increase your tendency to bleed.  4. You may put an ice pack around the bandaged area for 20 minutes every 2-3 hours. This may help reduce swelling, bruising, and pain. Make sure the ice pack is waterproof so that the pressure bandage does not get wet.   48 Hours After Surgery  If dressing is present carefully remove. You may also now shower and get the wound wet. Wash wound with a mild soap and water.  Use caution when washing the wound. Be gentle and do not let the forceful shower stream hit the wound directly.  PAT dry.  Daily Wound Care:  1. Wash wound with a mild soap and water.  Use caution when washing the wound, be gentle and do not let the forceful shower stream hit the wound directly.  2. PAT  DRY.  3. After one week start moisturizing the site with Vaseline or Aquafore  4. No tub soaking, bathing or swimming while sutures are in place or until after follow up appt.      Call Us If:  1. You have pain that is not controlled with Tylenol.  2. You have signs or symptoms of an infection, such as: fever over 100 degrees F, redness, warmth, or foul-smelling or yellow/creamy drainage from the wound.  Who should I call with questions?    [facility]: [phone]     [facility]: [phone]    For urgent needs outside of business hours call the [facility] at [phone] and ask for the dermatology resident on call

## 2020-07-24 NOTE — LETTER
7/24/2020         RE: Doug Johnson  [address]  [place] [address]        Dear Colleague,    Thank you for referring your patient, Doug Johnson, to the [facility]. Please see a copy of my visit note below.    PRESENTING COMPLAINT:  Followup visit for earlobe repair.      HISTORY OF PRESENTING COMPLAINT:  Mr. Johnson is 35 years old, here to have his ear lobes repaired.  He has wide earring opening and would like that closed.  No change in history and physical exam.  All risks, benefits and alternatives of the procedure were explained.  He understood them all and wants to proceed.      PROCEDURE NOTE:  After informed consent was taken from the patient, the proper site and procedure was ascertained with him, he was appropriately marked and taken to the procedure room.  Both ears and neck area were prepped and draped in standard surgical fashion.  Lidocaine 1% was injected in the mastoid region and in the upper neck region to get a regional block.  We then went ahead and excised the ring of skin in the opening on each side.  I then closed the openings in a manner that created a normal-looking earlobe and then excised some of the standing cutaneous cones on each side and then closed them in a simple fashion using 5-0 Prolene suture.  Total length of each side was about 2 cm, simple closure.  A dressing was placed on top.      FOLLOWUP:  We will see him back in 2 weeks to remove the sutures.      Total time spent with patient was 30 minutes, of which more than half was counseling and procedure.         Again, thank you for allowing me to participate in the care of your patient.        Sincerely,        BINA Zhu MD

## 2020-07-28 ENCOUNTER — TELEPHONE (OUTPATIENT)
Dept: SURGERY | Facility: CLINIC | Age: 35
End: 2020-07-28

## 2020-07-28 DIAGNOSIS — Z11.59 SCREENING FOR VIRAL DISEASE: ICD-10-CM

## 2020-07-28 PROCEDURE — 86769 SARS-COV-2 COVID-19 ANTIBODY: CPT | Performed by: FAMILY MEDICINE

## 2020-07-28 PROCEDURE — 36415 COLL VENOUS BLD VENIPUNCTURE: CPT | Performed by: FAMILY MEDICINE

## 2020-07-28 NOTE — TELEPHONE ENCOUNTER
[facility] Call Center    Phone Message    May a detailed message be left on voicemail: yes     Reason for Call: Patient called to reschedule his 8/7 appointment to 8/14. He has stitches in and wants to know if he can wait another week to get them removed. Please advise. Thank you.    Action Taken: Message routed to:  Adult Clinics: Surgery, General p [phone]    Travel Screening: Not Applicable

## 2020-07-28 NOTE — TELEPHONE ENCOUNTER
Pt called and notified that appt on 8/14 is okay. Pt instructed after steri strips come off to moisturize the site with clean vaseline. If any problems or concerns develop pt advised to call the clinic back. Pt voiced understanding and thanked RN for the call..Juju Roebrts RN

## 2020-07-29 LAB
COVID-19 ANTIBODY IGG: NEGATIVE
LAB TEST METHOD: NORMAL

## 2020-08-14 ENCOUNTER — OFFICE VISIT (OUTPATIENT)
Dept: SURGERY | Facility: CLINIC | Age: 35
End: 2020-08-14
Payer: COMMERCIAL

## 2020-08-14 DIAGNOSIS — S01.319D: Primary | ICD-10-CM

## 2020-08-14 PROCEDURE — 99212 OFFICE O/P EST SF 10 MIN: CPT | Performed by: PLASTIC SURGERY

## 2020-08-14 ASSESSMENT — PAIN SCALES - GENERAL: PAINLEVEL: NO PAIN (0)

## 2020-08-14 NOTE — PROGRESS NOTES
FOLLOWUP VISIT NOTE      PRESENTING COMPLAINT:  Followup visit, status post bilateral earlobe repairs done 07/24/2020.      HISTORY OF PRESENTING COMPLAINT:  Mr. Johnson is 35 years old.  He is 2 weeks out from procedure.  Done well.  No issues.      PHYSICAL EXAMINATION:  Vital signs are stable.  He is afebrile, in no obvious distress.  Both ears look normal.  Healing in well.  No evidence for infection.  They are symmetric.      ASSESSMENT AND PLAN:  Based on above findings, a diagnosis of bilateral earlobe repairs was made.  Took out the sutures.  Advised aggressive moisturization and sun protection.  I will see him back p.r.n.

## 2020-08-14 NOTE — LETTER
8/14/2020         RE: Doug Johnson  [address]  [place] [address]        Dear Colleague,    Thank you for referring your patient, Doug Johnson, to the [facility]. Please see a copy of my visit note below.    FOLLOWUP VISIT NOTE      PRESENTING COMPLAINT:  Followup visit, status post bilateral earlobe repairs done 07/24/2020.      HISTORY OF PRESENTING COMPLAINT:  Mr. Johnson is 35 years old.  He is 2 weeks out from procedure.  Done well.  No issues.      PHYSICAL EXAMINATION:  Vital signs are stable.  He is afebrile, in no obvious distress.  Both ears look normal.  Healing in well.  No evidence for infection.  They are symmetric.      ASSESSMENT AND PLAN:  Based on above findings, a diagnosis of bilateral earlobe repairs was made.  Took out the sutures.  Advised aggressive moisturization and sun protection.  I will see him back p.r.n.         Again, thank you for allowing me to participate in the care of your patient.        Sincerely,        BINA Zhu MD

## 2020-08-14 NOTE — NURSING NOTE
Doug Johnson's goals for this visit include:   Chief Complaint   Patient presents with     RECHECK     suture removal bilateral earlobe repair - some tenderness        He requests these members of his care team be copied on today's visit information: Yes     PCP: Shannon Oviedo    Referring Provider:  No referring provider defined for this encounter.    There were no vitals taken for this visit.    Do you need any medication refills at today's visit? No   [id number], MEDICAL ASSISTANT

## 2020-12-14 ENCOUNTER — HEALTH MAINTENANCE LETTER (OUTPATIENT)
Age: 35
End: 2020-12-14

## 2021-06-07 ENCOUNTER — OFFICE VISIT (OUTPATIENT)
Dept: FAMILY MEDICINE | Facility: CLINIC | Age: 36
End: 2021-06-07
Payer: COMMERCIAL

## 2021-06-07 VITALS
RESPIRATION RATE: 18 BRPM | SYSTOLIC BLOOD PRESSURE: 122 MMHG | DIASTOLIC BLOOD PRESSURE: 64 MMHG | OXYGEN SATURATION: 97 % | TEMPERATURE: 98.3 F | HEART RATE: 68 BPM

## 2021-06-07 DIAGNOSIS — J06.9 UPPER RESPIRATORY TRACT INFECTION, UNSPECIFIED TYPE: Primary | ICD-10-CM

## 2021-06-07 LAB
LABORATORY COMMENT REPORT: NORMAL
SARS-COV-2 RNA RESP QL NAA+PROBE: NEGATIVE
SARS-COV-2 RNA RESP QL NAA+PROBE: NORMAL
SPECIMEN SOURCE: NORMAL
SPECIMEN SOURCE: NORMAL

## 2021-06-07 PROCEDURE — U0005 INFEC AGEN DETEC AMPLI PROBE: HCPCS | Performed by: PHYSICIAN ASSISTANT

## 2021-06-07 PROCEDURE — 99213 OFFICE O/P EST LOW 20 MIN: CPT | Performed by: PHYSICIAN ASSISTANT

## 2021-06-07 PROCEDURE — U0003 INFECTIOUS AGENT DETECTION BY NUCLEIC ACID (DNA OR RNA); SEVERE ACUTE RESPIRATORY SYNDROME CORONAVIRUS 2 (SARS-COV-2) (CORONAVIRUS DISEASE [COVID-19]), AMPLIFIED PROBE TECHNIQUE, MAKING USE OF HIGH THROUGHPUT TECHNOLOGIES AS DESCRIBED BY CMS-2020-01-R: HCPCS | Performed by: PHYSICIAN ASSISTANT

## 2021-06-07 RX ORDER — ALBUTEROL SULFATE 90 UG/1
2 AEROSOL, METERED RESPIRATORY (INHALATION) EVERY 6 HOURS
Qty: 18 G | Refills: 0 | Status: SHIPPED | OUTPATIENT
Start: 2021-06-07 | End: 2021-10-28

## 2021-06-07 RX ORDER — AZITHROMYCIN 250 MG/1
TABLET, FILM COATED ORAL
Qty: 6 TABLET | Refills: 0 | Status: SHIPPED | OUTPATIENT
Start: 2021-06-07 | End: 2021-06-12

## 2021-06-07 ASSESSMENT — PATIENT HEALTH QUESTIONNAIRE - PHQ9
10. IF YOU CHECKED OFF ANY PROBLEMS, HOW DIFFICULT HAVE THESE PROBLEMS MADE IT FOR YOU TO DO YOUR WORK, TAKE CARE OF THINGS AT HOME, OR GET ALONG WITH OTHER PEOPLE: NOT DIFFICULT AT ALL
SUM OF ALL RESPONSES TO PHQ QUESTIONS 1-9: 4
SUM OF ALL RESPONSES TO PHQ QUESTIONS 1-9: 4

## 2021-06-07 ASSESSMENT — ENCOUNTER SYMPTOMS
SYNCOPE: 0
SORE THROAT: 1
VOMITING: 0
PND: 0
SPUTUM PRODUCTION: 1
HEMOPTYSIS: 0
SWOLLEN GLANDS: 0
RHINORRHEA: 1
LEG PAIN: 0
ABDOMINAL PAIN: 0
ORTHOPNEA: 0
CLAUDICATION: 1
HEADACHES: 0
FEVER: 0
NECK PAIN: 0

## 2021-06-07 ASSESSMENT — ANXIETY QUESTIONNAIRES
GAD7 TOTAL SCORE: 5
1. FEELING NERVOUS, ANXIOUS, OR ON EDGE: SEVERAL DAYS
7. FEELING AFRAID AS IF SOMETHING AWFUL MIGHT HAPPEN: SEVERAL DAYS
7. FEELING AFRAID AS IF SOMETHING AWFUL MIGHT HAPPEN: SEVERAL DAYS
GAD7 TOTAL SCORE: 5
2. NOT BEING ABLE TO STOP OR CONTROL WORRYING: SEVERAL DAYS
6. BECOMING EASILY ANNOYED OR IRRITABLE: SEVERAL DAYS
GAD7 TOTAL SCORE: 5
4. TROUBLE RELAXING: NOT AT ALL
5. BEING SO RESTLESS THAT IT IS HARD TO SIT STILL: NOT AT ALL
3. WORRYING TOO MUCH ABOUT DIFFERENT THINGS: SEVERAL DAYS

## 2021-06-07 ASSESSMENT — PAIN SCALES - GENERAL: PAINLEVEL: NO PAIN (0)

## 2021-06-07 NOTE — LETTER
[facility] [place]  [address], [address]  [place] [address]  Phone: [phone]  Fax: [phone]    June 7, 2021        Doug Johnson  [address]  [place] [address]          To whom it may concern:    RE: Doug Johnson    Patient was seen and treated today at our clinic and missed work. Please excuse from work today 06/07/2021 and tomorrow 06/08/2021.     Please contact me for questions or concerns.      Sincerely,        Shannon Oviedo PA-C

## 2021-06-07 NOTE — PATIENT INSTRUCTIONS
[facility] christina    Covid test- if result is negative then can return to work     Start antibiotic   Albuterol inhaler 2 puffs every 6 hr for cough     Recheck if not improving or worsening over next 3 days

## 2021-06-07 NOTE — PROGRESS NOTES
"Assessment & Plan     Upper respiratory tract infection, unspecified type  7-8 days URI sx on worsening course in pt previously vaccinated for covid19 (son w/URI sx also and tested neg covid), pt vaping/smoking marijuana.   Covid testing today for employer, low risk for covid. Letter given to be out of work while awaiting test results. If positive- then isolation per guidelines.  Treat per below   Recheck if not improving as expected   - Symptomatic COVID-19 Virus (Coronavirus) by PCR  - azithromycin (ZITHROMAX) 250 MG tablet; Take 2 tablets (500 mg) by mouth daily for 1 day, THEN 1 tablet (250 mg) daily for 4 days.  - albuterol (PROAIR HFA/PROVENTIL HFA/VENTOLIN HFA) 108 (90 Base) MCG/ACT inhaler; Inhale 2 puffs into the lungs every 6 hours     Depression Screening Follow Up    PHQ 6/7/2021   PHQ-9 Total Score 4   Q9: Thoughts of better off dead/self-harm past 2 weeks Several days   F/U: Thoughts of suicide or self-harm No   F/U: Safety concerns No   Pt reports he is not having suicidal thoughts or thoughts of self harm. Reports stress from work. \"On nothing like that. I have young kids, I'd never do that.\" Denies passive or active thoughts, intentions or plan.      Follow Up: The patient was instructed to contact clinic for worsening symptoms, non-improvement in time frame as expected/discussed, and for questions regarding medications or treatment plan. For virtual visits, the patient was advised to be seen for in person evaluation if symptoms or condition are worsening or non-improvement as expected.       Return in about 3 days (around 6/10/2021).    Shannon Oviedo PA-C  [facility] RUBIA Christianson     Doug Johnson is a 36 year old male who presents to clinic today for the following health issues accompanied by his     HPI   Fv Hpi Upper Respiratory Infection    Question 6/7/2021  8:22 AM CDT - Filed by Patient   Your shortness of breath is a... new problem   When did you first notice " your shortness of breath? Yesterday   How often do you feel short of breath? Rarely   Since you first noticed this problem, how has it changed? Gradually worsening   When you have shortness of breath, how long does it last? 5 seconds   Are you experiencing any of the following symptoms with your shortness of breath?    Abdominal pain No   Chest pain Yes   Leg muscle pain with activity Yes   Stuffy nose Yes   Ear pain No   Fever No   Headaches No   Coughing up blood No   Leg pain No   Leg swelling No   Neck pain No   Shortness of breath while lying down No   Difficulty breathing that wakes you up No   Rash No   Runny nose Yes   Sore throat Yes   Coughing up phlegm or mucus Yes   Swollen glands No   Fainting No   Vomiting No   Wheezing    Which of the following makes your shortness of breath worse? Cold or sinus infection   Answers for HPI/ROS submitted by the patient on 6/7/2021   If you checked off any problems, how difficult have these problems made it for you to do your work, take care of things at home, or get along with other people?: Not difficult at all  PHQ9 TOTAL SCORE: 4  GILMAR 7 TOTAL SCORE: 5  Chronicity: new  Onset: onset 1 week, worse yesterday  Frequency: rarely  Progression since onset: gradually worsening  Episode duration: 5 seconds  claudication: No- body aches   coryza: Yes  hemoptysis: No  leg pain: No  leg swelling: No  orthopnea: No  PND: No  sputum production: Yes  swollen glands: No  syncope: No  Aggravating factors: URIs    URI sx x 1 week  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 Working outside in the heat- in upper 80s-90s.  He notes going from heat to air conditioning feels like that causes sx or illness.  Sx onset for 1 week, worse yesterday. Last week small cough, nose draining a bunch, sore throat. Worse through the week. Weekend didn't sleep well. Then was sweaty overnight over weekend. Chills, body aches.   With coughing takes time to catch breath.  Coughing up mucus clear.   Chest sore from coughing.   Fevers- none recently.   No tobacco. +marijuana smoke and vape.   Vaccinated for covid19 with pfizer.   No hx of asthma. No seasonal allergies- had testing negative but sneezes when going out to inside and frequently.   No hx of pneumonia.   Kids- in - daughter teething and rhinorrhea; son cough illness neg covid testing, viral. No febrile illnesses.     PHQ 6/7/2021   PHQ-9 Total Score 4   Q9: Thoughts of better off dead/self-harm past 2 weeks Several days   F/U: Thoughts of suicide or self-harm No   F/U: Safety concerns No         Review of Systems   Constitutional: Negative for fever.   HENT: Positive for rhinorrhea and sore throat. Negative for ear pain.    Cardiovascular: Positive for chest pain.   Gastrointestinal: Negative for abdominal pain and vomiting.   Musculoskeletal: Negative for neck pain.   Skin: Negative for rash.   Neurological: Negative for headaches.          Objective    /64   Pulse 68   Temp 98.3  F (36.8  C) (Temporal)   Resp 18   SpO2 97%   There is no height or weight on file to calculate BMI.  Physical Exam   GENERAL: healthy, alert and no distress  EYES: Eyes grossly normal to inspection, PERRL and conjunctivae and sclerae normal  HENT: Normal cephalic/atraumatic. Right ear: canal clear and TM's normal, no effusion.  Left ear: canal clear and TM's normal, no effusion. Nose mucosa: congested, +erythema. Lips normal, no lesions. Buccal muscosa moist. Soft palate no lesions or ulcers. Tonsils normal, no erythema, no exudates. Uvula midline. Posterior oropharynx no erythema.   NECK: no adenopathy, no asymmetry, masses, or scars and thyroid normal to palpation  RESP:  lungs clear to auscultation - no rales, rhonchi or wheezes  CV: regular rate and rhythm, normal S1 S2, no S3 or S4, no murmur, click or rub, no peripheral edema and peripheral pulses strong  ABDOMEN: soft, nontender, no hepatosplenomegaly, no masses and bowel  sounds normal  MS: no gross musculoskeletal defects noted, no edema  Psych: mood neutral, affect normal/pleasant. Neatly groomed casually dressed.

## 2021-06-08 ASSESSMENT — PATIENT HEALTH QUESTIONNAIRE - PHQ9: SUM OF ALL RESPONSES TO PHQ QUESTIONS 1-9: 4

## 2021-06-08 ASSESSMENT — ANXIETY QUESTIONNAIRES: GAD7 TOTAL SCORE: 5

## 2021-10-02 ENCOUNTER — HEALTH MAINTENANCE LETTER (OUTPATIENT)
Age: 36
End: 2021-10-02

## 2021-10-25 NOTE — PROGRESS NOTES
SUBJECTIVE:   CC: Doug Johnson is an 36 year old male who presents for preventative health visit.       Patient has been advised of split billing requirements and indicates understanding: Yes  Healthy Habits:     Getting at least 3 servings of Calcium per day:  NO    Bi-annual eye exam:  NO    Dental care twice a year:  Yes    Sleep apnea or symptoms of sleep apnea:  Sleep apnea    Diet:  Regular (no restrictions)    Frequency of exercise:  1 day/week    Duration of exercise:  15-30 minutes    Taking medications regularly:  Yes    Medication side effects:  None    PHQ-2 Total Score: 1    Additional concerns today:  No      Routine Health Maintenance:  Fasting: yes  Immunizations: flu shot- will do today  Fam hx of colon cancer: no  Fam hx of prostate cancer: no  Sexually active: yes. STD screening: no concerns, , monogamous    Biometric screening form to be completed    Does DOT exams.   He had been on medications at one time for BP a number of years ago- he can't recall what he took.   He cut out alcohol and lost weight and didn't need medications.  BP here 132/86    For exercise: active with kids- 2 little kids. Job is physical; feels good with exercise. Limitations with exercise due to: nothing. Denies CV sxs with exercise including CP, SOB, palpitations, COUGHLIN, presyncope.    Right lower leg pain  Pulled a muscle in right leg a few weeks ago. [place] or may have pulled something when he was hurrying to his car. Thinks may have felt like something popped when walking fast. Has been achy at times.  Worse w/certain movements. Pain is in the back and medially. No hx blood clot.       Today's PHQ-2 Score:   PHQ-2 ( 1999 Pfizer) 10/28/2021   Q1: Little interest or pleasure in doing things 0   Q2: Feeling down, depressed or hopeless 1   PHQ-2 Score 1   Q1: Little interest or pleasure in doing things Not at all   Q2: Feeling down, depressed or hopeless Several days   PHQ-2 Score 1      GILMAR-7 SCORE 6/7/2021   Total Score 5 (mild anxiety)   Total Score 5       PHQ 6/7/2021   PHQ-9 Total Score 4   Q9: Thoughts of better off dead/self-harm past 2 weeks Several days   F/U: Thoughts of suicide or self-harm No   F/U: Safety concerns No       Abuse: Current or Past(Physical, Sexual or Emotional)- No  Do you feel safe in your environment? Yes    Have you ever done Advance Care Planning? (For example, a Health Directive, POLST, or a discussion with a medical provider or your loved ones about your wishes): No, advance care planning information given to patient to review.  Patient declined advance care planning discussion at this time.    Social History     Tobacco Use     Smoking status: Former Smoker     Packs/day: 0.50     Years: 5.00     Pack years: 2.50     Smokeless tobacco: Never Used   Substance Use Topics     Alcohol use: No         Alcohol Use 9/4/2019   Prescreen: >3 drinks/day or >7 drinks/week? No   Prescreen: >3 drinks/day or >7 drinks/week? -       Last PSA: No results found for: PSA    Reviewed orders with patient. Reviewed health maintenance and updated orders accordingly - Yes  Lab work is in process  Labs reviewed in EPIC  BP Readings from Last 3 Encounters:   10/28/21 132/86   06/07/21 122/64   07/24/20 135/84    Wt Readings from Last 3 Encounters:   10/28/21 89.9 kg (198 lb 4.8 oz)   12/24/19 94.8 kg (209 lb)   11/20/19 93.9 kg (207 lb)                  Patient Active Problem List   Diagnosis     CARDIOVASCULAR SCREENING; LDL GOAL LESS THAN 160     Family history of aortic aneurysm     Elevated BP without diagnosis of hypertension     Major depressive disorder, recurrent episode, moderate (H)     History reviewed. No pertinent surgical history.    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   HENT: Negative for congestion, ear pain, hearing loss and sore throat.    Eyes: Negative for pain and visual disturbance.   Respiratory: Negative for cough and shortness of breath.    Cardiovascular: Negative for chest pain, palpitations and peripheral edema.   Gastrointestinal: Negative for abdominal pain, constipation, diarrhea, heartburn, hematochezia and nausea.   Genitourinary: Negative for discharge, dysuria, frequency, genital sores, hematuria, impotence and urgency.   Musculoskeletal: Positive for myalgias. Negative for arthralgias and joint swelling.   Skin: Negative for rash.   Neurological: Negative for dizziness, weakness, headaches and paresthesias.   Psychiatric/Behavioral: Negative for mood changes. The patient is not nervous/anxious.        OBJECTIVE:   /86   Pulse 68   Temp 97.5  F (36.4  C) (Temporal)   Resp 18   Ht 1.778 m (5' 10\")   Wt 89.9 kg (198 lb 4.8 oz)   SpO2 98%   BMI 28.45 kg/m      Physical Exam  GENERAL: healthy, alert and no distress  EYES: Eyes grossly normal to inspection, PERRL and conjunctivae and sclerae normal  HENT: ear canals and TM's normal, nose and mouth without ulcers or lesions  NECK: no adenopathy, no asymmetry, masses, or scars and thyroid normal to palpation  RESP: lungs clear to auscultation - no rales, rhonchi or wheezes  CV: regular rate and rhythm, normal S1 S2, no S3 or S4, no murmur, click or rub, no peripheral edema and " peripheral pulses strong  ABDOMEN: soft, nontender, no hepatosplenomegaly, no masses and bowel sounds normal   (male): pt deferred  MS: RIGHT lower extremity: focally tender firm area posterior-medially- muscle cramp/spasm vs other. Non tender popliteal fossa. No edema. No erythema. Pt has tattoos of leg. Normal ROM. N gross musculoskeletal defects noted, no edema  SKIN: no suspicious lesions or rashes  NEURO: Normal strength and tone, mentation intact and speech normal  PSYCH: mentation appears normal, affect normal/bright  LYMPH: normal ant/post cervical, supraclavicular nodes    Diagnostic Test Results:  Labs reviewed in [facility] Lower Extremity Venous Duplex Right    Result Date: 10/28/2021  Right lower extremity Doppler venous ultrasound Comparisons: None. HISTORY: Right leg pain. FINDINGS: The right common femoral, femoral, popliteal and posterior tibial veins are fully compressible with normal Doppler venous wave forms. The left common femoral vein was visualized for comparison and demonstrates a normal waveform. In the area of symptoms, only normal structures are seen including normal-appearing musculature.     IMPRESSION:    No DVT in the right lower extremity. BEAR DYE MD   SYSTEM ID:  [id number]      ASSESSMENT/PLAN:   1. Routine general medical examination at a health care facility  Reviewed VS and weight/BMI with pt   Immunizations:    updated - see below   Counseling as below   Health screenings/maintenance per orders/comments below     Flu vaccine given     - INFLUENZA VACCINE IM > 6 MONTHS VALENT IIV4 (AFLURIA/FLUZONE)  - Lipid panel reflex to direct LDL Fasting; Future  - Glucose; Future  - Glucose  - Lipid panel reflex to direct LDL Fasting    2. Encounter for biometric screening  Labs drawn, form to be completed and faxed.   - Lipid panel reflex to direct LDL Fasting; Future  - Glucose; Future  - Glucose  - Lipid panel reflex to direct LDL Fasting    3. Pain of right lower leg  Calf  "pain for a few weeks.  On exam focal area of muscle spasm.   US ruled out DVT  Treat w/muscle relaxer at bedtime, stretching, ice, heat, etc    - US Lower Extremity Venous Duplex Right; Future  - cyclobenzaprine (FLEXERIL) 10 MG tablet; Take 1 tablet (10 mg) by mouth nightly as needed for muscle spasms  Dispense: 20 tablet; Refill: 0    4. Major depressive disorder, recurrent episode, in full remission (H)  He is not treating with medications at this time. Feel good. Sx in remission.   Continue self cares.      Patient has been advised of [employer] billing requirements and indicates understanding: Yes  COUNSELING:   Reviewed preventive health counseling, as reflected in patient instructions       Regular exercise       Healthy diet/nutrition    Estimated body mass index is 29.99 kg/m  as calculated from the following:    Height as of 12/24/19: 1.778 m (5' 10\").    Weight as of 12/24/19: 94.8 kg (209 lb).     Weight management plan: Discussed healthy diet and exercise guidelines    He reports that he has quit smoking. He has a 2.50 pack-year smoking history. He has never used smokeless tobacco.      Counseling Resources:  ATP IV Guidelines  Pooled Cohorts Equation Calculator  FRAX Risk Assessment  ICSI Preventive Guidelines  Dietary Guidelines for Americans, 2010  USDA's MyPlate  ASA Prophylaxis  Lung CA Screening    REBECCA Lorenzo [facility]  "

## 2021-10-25 NOTE — PATIENT INSTRUCTIONS
Preventive Health Recommendations  Male Ages 26 - 39    Yearly exam:             See your health care provider every year in order to  o   Review health changes.   o   Discuss preventive care.    o   Review your medicines if your doctor has prescribed any.    You should be tested each year for STDs (sexually transmitted diseases), if you re at risk.     After age 35, talk to your provider about cholesterol testing. If you are at risk for heart disease, have your cholesterol tested at least every 5 years.     If you are at risk for diabetes, you should have a diabetes test (fasting glucose).  Shots: Get a flu shot each year. Get a tetanus shot every 10 years.     Nutrition:    Eat at least 5 servings of fruits and vegetables daily.     Eat whole-grain bread, whole-wheat pasta and brown rice instead of white grains and rice.     Get adequate Calcium and Vitamin D.     Lifestyle    Exercise for at least 150 minutes a week (30 minutes a day, 5 days a week). This will help you control your weight and prevent disease.     Limit alcohol to one drink per day.     No smoking.     Wear sunscreen to prevent skin cancer.     See your dentist every six months for an exam and cleaning.

## 2021-10-28 ENCOUNTER — OFFICE VISIT (OUTPATIENT)
Dept: FAMILY MEDICINE | Facility: CLINIC | Age: 36
End: 2021-10-28
Payer: COMMERCIAL

## 2021-10-28 ENCOUNTER — ANCILLARY PROCEDURE (OUTPATIENT)
Dept: ULTRASOUND IMAGING | Facility: CLINIC | Age: 36
End: 2021-10-28
Attending: PHYSICIAN ASSISTANT
Payer: COMMERCIAL

## 2021-10-28 VITALS
DIASTOLIC BLOOD PRESSURE: 86 MMHG | TEMPERATURE: 97.5 F | SYSTOLIC BLOOD PRESSURE: 132 MMHG | HEART RATE: 68 BPM | HEIGHT: 70 IN | BODY MASS INDEX: 28.39 KG/M2 | WEIGHT: 198.3 LBS | OXYGEN SATURATION: 98 % | RESPIRATION RATE: 18 BRPM

## 2021-10-28 DIAGNOSIS — M79.661 PAIN OF RIGHT LOWER LEG: ICD-10-CM

## 2021-10-28 DIAGNOSIS — Z00.00 ROUTINE GENERAL MEDICAL EXAMINATION AT A HEALTH CARE FACILITY: Primary | ICD-10-CM

## 2021-10-28 DIAGNOSIS — Z00.8 ENCOUNTER FOR BIOMETRIC SCREENING: ICD-10-CM

## 2021-10-28 DIAGNOSIS — F33.42 MAJOR DEPRESSIVE DISORDER, RECURRENT EPISODE, IN FULL REMISSION (H): ICD-10-CM

## 2021-10-28 LAB
CHOLEST SERPL-MCNC: 167 MG/DL
FASTING STATUS PATIENT QL REPORTED: YES
FASTING STATUS PATIENT QL REPORTED: YES
GLUCOSE BLD-MCNC: 96 MG/DL (ref 70–99)
HDLC SERPL-MCNC: 46 MG/DL
LDLC SERPL CALC-MCNC: 100 MG/DL
NONHDLC SERPL-MCNC: 121 MG/DL
TRIGL SERPL-MCNC: 104 MG/DL

## 2021-10-28 PROCEDURE — 80061 LIPID PANEL: CPT | Performed by: PHYSICIAN ASSISTANT

## 2021-10-28 PROCEDURE — 36415 COLL VENOUS BLD VENIPUNCTURE: CPT | Performed by: PHYSICIAN ASSISTANT

## 2021-10-28 PROCEDURE — 99213 OFFICE O/P EST LOW 20 MIN: CPT | Mod: 25 | Performed by: PHYSICIAN ASSISTANT

## 2021-10-28 PROCEDURE — 90471 IMMUNIZATION ADMIN: CPT | Performed by: PHYSICIAN ASSISTANT

## 2021-10-28 PROCEDURE — 82947 ASSAY GLUCOSE BLOOD QUANT: CPT | Performed by: PHYSICIAN ASSISTANT

## 2021-10-28 PROCEDURE — 93971 EXTREMITY STUDY: CPT | Mod: RT

## 2021-10-28 PROCEDURE — 99395 PREV VISIT EST AGE 18-39: CPT | Mod: 25 | Performed by: PHYSICIAN ASSISTANT

## 2021-10-28 PROCEDURE — 90686 IIV4 VACC NO PRSV 0.5 ML IM: CPT | Performed by: PHYSICIAN ASSISTANT

## 2021-10-28 RX ORDER — CYCLOBENZAPRINE HCL 10 MG
10 TABLET ORAL
Qty: 20 TABLET | Refills: 0 | Status: SHIPPED | OUTPATIENT
Start: 2021-10-28

## 2021-10-28 ASSESSMENT — ENCOUNTER SYMPTOMS
CONSTIPATION: 0
ARTHRALGIAS: 0
WEAKNESS: 0
NAUSEA: 0
CHILLS: 0
HEMATURIA: 0
FREQUENCY: 0
MYALGIAS: 1
PALPITATIONS: 0
FEVER: 0
NERVOUS/ANXIOUS: 0
DIZZINESS: 0
JOINT SWELLING: 0
DIARRHEA: 0
HEMATOCHEZIA: 0
ABDOMINAL PAIN: 0
COUGH: 0
DYSURIA: 0
SHORTNESS OF BREATH: 0
SORE THROAT: 0
HEADACHES: 0
HEARTBURN: 0
EYE PAIN: 0
PARESTHESIAS: 0

## 2021-10-28 ASSESSMENT — MIFFLIN-ST. JEOR: SCORE: 1845.73

## 2021-10-28 ASSESSMENT — PAIN SCALES - GENERAL: PAINLEVEL: EXTREME PAIN (8)

## 2021-11-02 ENCOUNTER — TELEPHONE (OUTPATIENT)
Dept: FAMILY MEDICINE | Facility: CLINIC | Age: 36
End: 2021-11-02

## 2021-11-02 NOTE — TELEPHONE ENCOUNTER
Patient was notified.   AYAAN WashingtonN, RN, PHN  [facility]  November 2, 2021

## 2021-11-02 NOTE — TELEPHONE ENCOUNTER
----- Message from Shannon Oviedo PA-C sent at 10/28/2021  3:52 PM CDT -----  Please call pt- US of the right leg was negative for DVT (clot). The area noted on exam is muscle- no mass or other abnormality. Try the muscle relaxer at bedtime for muscle spasm, stretching, ice, heat.   Do not drive after taking the muscle relaxer. Shannon Oviedo PA-C

## 2022-09-03 ENCOUNTER — HEALTH MAINTENANCE LETTER (OUTPATIENT)
Age: 37
End: 2022-09-03

## 2023-01-15 ENCOUNTER — HEALTH MAINTENANCE LETTER (OUTPATIENT)
Age: 38
End: 2023-01-15

## 2023-04-27 ENCOUNTER — VIRTUAL VISIT (OUTPATIENT)
Dept: FAMILY MEDICINE | Facility: CLINIC | Age: 38
End: 2023-04-27
Payer: COMMERCIAL

## 2023-04-27 DIAGNOSIS — R20.2 NUMBNESS AND TINGLING IN BOTH HANDS: Primary | ICD-10-CM

## 2023-04-27 DIAGNOSIS — R20.0 NUMBNESS AND TINGLING IN BOTH HANDS: Primary | ICD-10-CM

## 2023-04-27 PROCEDURE — 99214 OFFICE O/P EST MOD 30 MIN: CPT | Mod: VID | Performed by: PHYSICIAN ASSISTANT

## 2023-04-27 ASSESSMENT — PATIENT HEALTH QUESTIONNAIRE - PHQ9
10. IF YOU CHECKED OFF ANY PROBLEMS, HOW DIFFICULT HAVE THESE PROBLEMS MADE IT FOR YOU TO DO YOUR WORK, TAKE CARE OF THINGS AT HOME, OR GET ALONG WITH OTHER PEOPLE: SOMEWHAT DIFFICULT
SUM OF ALL RESPONSES TO PHQ QUESTIONS 1-9: 2
SUM OF ALL RESPONSES TO PHQ QUESTIONS 1-9: 2

## 2023-04-27 NOTE — PATIENT INSTRUCTIONS
Wrist splints for carpal tunnel - keep wrist in neutral position.  Wear night and day for best rests  Wears splints 1-2months if seeing improvement.   Ice wrist 20min a few times per day  Short course anti-inflammatories like ibuprofen or aleve   Labs   If not improving sx please make an in person clinic appointment    NSAIDs:  Ibuprofen 600mg every 8 hr for 5-7 days. Take with food  OR   Aleve every 12 hrs for 5-7 days. Take with food  Stop if GI upset.

## 2023-04-27 NOTE — PROGRESS NOTES
Doug is a 37 year old who is being evaluated via a billable video visit.      How would you like to obtain your AVS? MyChart  If the video visit is dropped, the invitation should be resent by: Text to cell phone: [phone]  Will anyone else be joining your video visit? No          Assessment & Plan     Numbness and tingling in both hands  Tingling in finger tips with pressure in palms.   Sx most typical of carpal tunnel  No radicular features.  He demonstrated Phalens maneuver with positive results tingling into fingers.   Will screen labs  Advised wrist splints, ice. Short course nsaids with food.   If these measures do not alleviate sx- need in person visit  - CBC with platelets; Future  - Vitamin B12; Future  - TSH with free T4 reflex; Future  - Hemoglobin A1c; Future     Follow Up: The patient was instructed to contact clinic for worsening symptoms, non-improvement in time frame as expected/discussed, and for questions regarding medications or treatment plan. For virtual visits, the patient was advised to be seen for in person evaluation if symptoms or condition are worsening or non-improvement as expected.       Shannon Oviedo PA-C  [facility] RUBIA Camacho is a 37 year old, presenting for the following health issues:  Hand Pain and Foot Pain        4/27/2023     1:50 PM   Additional Questions   Roomed by Alf GRIJALVA   Accompanied by N/A         4/27/2023     1:50 PM   Patient Reported Additional Medications   Patient reports taking the following new medications None     History of Present Illness       Reason for visit:  Pain in my finger tips and occasionally my toes  Symptom onset:  More than a month  Symptoms include:  Sore hands, occasional sharp piercing pain  Symptom intensity:  Moderate  Symptom progression:  Worsening  Had these symptoms before:  No    He eats 0-1 servings of fruits and vegetables daily.He consumes 3 sweetened beverage(s) daily.He exercises with enough  "effort to increase his heart rate 10 to 19 minutes per day.  He exercises with enough effort to increase his heart rate 3 or less days per week.   He is taking medications regularly.    Today's PHQ-9         PHQ-9 Total Score: 2    PHQ-9 Q9 Thoughts of better off dead/self-harm past 2 weeks :   Not at all    How difficult have these problems made it for you to do your work, take care of things at home, or get along with other people: Somewhat difficult       Pain History:  When did you first notice your pain?      Hand/finger feelings  In the fall my fingertips were tingly and was accompanied also like added pressure in my hands.   Thought it was the weather changing.   Right now hands feel a pressure \"like I'm gripping a rope\" and tingling little bit in tips.   Sx come and go.  Desk job- a lot of typing/computer  Sx with driving sometimes  No radicular neck sx  Normal diet  He did reading online and has diabetes concerns. He is unsure of fam hx.  Mom Graves   Sometimes sharp pain in toes and pressure.  Ran 15k race recently. Working on healthy habits        Review of Systems   Constitutional, HEENT, cardiovascular, pulmonary, gi and gu systems are negative, except as otherwise noted.      Objective           Vitals:  No vitals were obtained today due to virtual visit.    Physical Exam   GENERAL: Healthy, alert and no distress  EYES: Eyes grossly normal to inspection.  No discharge or erythema, or obvious scleral/conjunctival abnormalities.  HENT: Normal cephalic/atraumatic.  External ears, nose and mouth without ulcers or lesions.  No nasal drainage visible.  RESP: No audible wheeze, cough, or visible cyanosis.  No visible retractions or increased work of breathing.    MS: hands appear normal to inspection on video. Pt demonstrated Phalens which reproduced tingling in his finger tips both hands.   SKIN: Visible skin clear. No significant rash, abnormal pigmentation or lesions.  NEURO: Cranial nerves grossly intact. "  Mentation and speech appropriate for age.  PSYCH: Mentation appears normal, affect normal/bright, judgement and insight intact, normal speech and appearance well-groomed.            Video-Visit Details    Type of service:  Video Visit     Originating Location (pt. Location): Parked car  Distant Location (provider location):  Off-site  Platform used for Video Visit: [employer]

## 2024-02-17 ENCOUNTER — HEALTH MAINTENANCE LETTER (OUTPATIENT)
Age: 39
End: 2024-02-17

## 2024-12-02 ENCOUNTER — OFFICE VISIT (OUTPATIENT)
Dept: FAMILY MEDICINE | Facility: CLINIC | Age: 39
End: 2024-12-02
Payer: COMMERCIAL

## 2024-12-02 VITALS
RESPIRATION RATE: 16 BRPM | OXYGEN SATURATION: 98 % | WEIGHT: 205.8 LBS | HEART RATE: 78 BPM | TEMPERATURE: 98 F | HEIGHT: 70 IN | DIASTOLIC BLOOD PRESSURE: 103 MMHG | BODY MASS INDEX: 29.46 KG/M2 | SYSTOLIC BLOOD PRESSURE: 154 MMHG

## 2024-12-02 DIAGNOSIS — F33.1 MAJOR DEPRESSIVE DISORDER, RECURRENT EPISODE, MODERATE (H): ICD-10-CM

## 2024-12-02 DIAGNOSIS — R03.0 ELEVATED BP WITHOUT DIAGNOSIS OF HYPERTENSION: ICD-10-CM

## 2024-12-02 DIAGNOSIS — R35.0 INCREASED FREQUENCY OF URINATION: ICD-10-CM

## 2024-12-02 DIAGNOSIS — R59.9 ENLARGED LYMPH NODE: ICD-10-CM

## 2024-12-02 DIAGNOSIS — N50.89 TESTICULAR SWELLING: Primary | ICD-10-CM

## 2024-12-02 DIAGNOSIS — Z13.1 SCREENING FOR DIABETES MELLITUS: ICD-10-CM

## 2024-12-02 LAB
ALBUMIN UR-MCNC: NEGATIVE MG/DL
APPEARANCE UR: CLEAR
BASOPHILS # BLD AUTO: 0 10E3/UL (ref 0–0.2)
BASOPHILS NFR BLD AUTO: 0 %
BILIRUB UR QL STRIP: NEGATIVE
COLOR UR AUTO: YELLOW
EOSINOPHIL # BLD AUTO: 0.1 10E3/UL (ref 0–0.7)
EOSINOPHIL NFR BLD AUTO: 2 %
ERYTHROCYTE [DISTWIDTH] IN BLOOD BY AUTOMATED COUNT: 12.1 % (ref 10–15)
EST. AVERAGE GLUCOSE BLD GHB EST-MCNC: 97 MG/DL
GLUCOSE UR STRIP-MCNC: NEGATIVE MG/DL
HBA1C MFR BLD: 5 % (ref 0–5.6)
HCT VFR BLD AUTO: 47 % (ref 40–53)
HGB BLD-MCNC: 15.9 G/DL (ref 13.3–17.7)
HGB UR QL STRIP: NEGATIVE
IMM GRANULOCYTES # BLD: 0 10E3/UL
IMM GRANULOCYTES NFR BLD: 1 %
KETONES UR STRIP-MCNC: NEGATIVE MG/DL
LEUKOCYTE ESTERASE UR QL STRIP: NEGATIVE
LYMPHOCYTES # BLD AUTO: 2 10E3/UL (ref 0.8–5.3)
LYMPHOCYTES NFR BLD AUTO: 32 %
MCH RBC QN AUTO: 30.6 PG (ref 26.5–33)
MCHC RBC AUTO-ENTMCNC: 33.8 G/DL (ref 31.5–36.5)
MCV RBC AUTO: 90 FL (ref 78–100)
MONOCYTES # BLD AUTO: 0.4 10E3/UL (ref 0–1.3)
MONOCYTES NFR BLD AUTO: 7 %
NEUTROPHILS # BLD AUTO: 3.7 10E3/UL (ref 1.6–8.3)
NEUTROPHILS NFR BLD AUTO: 59 %
NITRATE UR QL: NEGATIVE
PH UR STRIP: 6 [PH] (ref 5–7)
PLATELET # BLD AUTO: 235 10E3/UL (ref 150–450)
RBC # BLD AUTO: 5.2 10E6/UL (ref 4.4–5.9)
RBC #/AREA URNS AUTO: ABNORMAL /HPF
SP GR UR STRIP: 1.02 (ref 1–1.03)
SQUAMOUS #/AREA URNS AUTO: ABNORMAL /LPF
UROBILINOGEN UR STRIP-ACNC: 0.2 E.U./DL
WBC # BLD AUTO: 6.3 10E3/UL (ref 4–11)
WBC #/AREA URNS AUTO: ABNORMAL /HPF

## 2024-12-02 PROCEDURE — 85025 COMPLETE CBC W/AUTO DIFF WBC: CPT

## 2024-12-02 PROCEDURE — 36415 COLL VENOUS BLD VENIPUNCTURE: CPT

## 2024-12-02 PROCEDURE — 83036 HEMOGLOBIN GLYCOSYLATED A1C: CPT

## 2024-12-02 PROCEDURE — 99214 OFFICE O/P EST MOD 30 MIN: CPT

## 2024-12-02 PROCEDURE — 96127 BRIEF EMOTIONAL/BEHAV ASSMT: CPT

## 2024-12-02 PROCEDURE — 81001 URINALYSIS AUTO W/SCOPE: CPT

## 2024-12-02 ASSESSMENT — PATIENT HEALTH QUESTIONNAIRE - PHQ9
10. IF YOU CHECKED OFF ANY PROBLEMS, HOW DIFFICULT HAVE THESE PROBLEMS MADE IT FOR YOU TO DO YOUR WORK, TAKE CARE OF THINGS AT HOME, OR GET ALONG WITH OTHER PEOPLE: VERY DIFFICULT
SUM OF ALL RESPONSES TO PHQ QUESTIONS 1-9: 12
SUM OF ALL RESPONSES TO PHQ QUESTIONS 1-9: 12

## 2024-12-02 NOTE — PROGRESS NOTES
"  Assessment & Plan     Testicular swelling  Bilateral swelling. CT scan ordered for further evaluation.   - CT Abdomen Pelvis w Contrast; Future    Enlarged lymph node  Left enlarged inguinal lymph node, CBC negative. CT scan ordered.   - CBC with platelets and differential; Future  - CT Abdomen Pelvis w Contrast; Future  - CBC with platelets and differential    Screening for diabetes mellitus  - Hemoglobin A1c; Future  - Hemoglobin A1c    Increased frequency of urination  UA negative for infection and hgba1c WNL. He has increased his water intake which could be causing symptoms.   - UA with Microscopic reflex to Culture - lab collect; Future  - Hemoglobin A1c; Future  - UA with Microscopic reflex to Culture - lab collect  - Hemoglobin A1c  - UA Microscopic with Reflex to Culture    Elevated BP without diagnosis of hypertension  Patient is very nervous for todays visit casing increased blood pressure.     Major depressive disorder, recurrent episode, moderate (H)  Has recently started therapy. Discussed medication management which he deferred at this time. Educated patient on 988 and to follow up in clinic if further assistance is needed.     BMI  Estimated body mass index is 29.53 kg/m  as calculated from the following:    Height as of this encounter: 1.778 m (5' 10\").    Weight as of this encounter: 93.4 kg (205 lb 12.8 oz).       Depression Screening Follow Up        12/2/2024    10:07 AM   PHQ   PHQ-9 Total Score 12    Q9: Thoughts of better off dead/self-harm past 2 weeks Several days    F/U: Thoughts of suicide or self-harm No    F/U: Safety concerns No        Patient-reported       Subjective   Doug is a 39 year old, presenting for the following health issues:  Testicular Problem (Swelling in groin area and pain in testicles down leg and in back)        12/2/2024    10:24 AM   Additional Questions   Roomed by george galindo     History of Present Illness       Reason for visit:  Swelling in my groin area and " "hemorrhoids  Symptom onset:  More than a month  Symptoms include:  Swelling and pain in my legs and stomach  Symptom intensity:  Moderate  Symptom progression:  Staying the same  Had these symptoms before:  Yes  Has tried/received treatment for these symptoms:  Yes  Previous treatment was successful:  No  What makes it worse:  Being on my feet all day  What makes it better:  Resting and using cannabis   He is taking medications regularly.     First noticed about a month ago, pain mainly right leg but does have pain to left leg. Occasional back pain. Sometimes has pains to abdomen. Waxes and wanes. Feels pressure to testicals. No recent illnesses.     Seeing a therapist, started a month ago.                   Objective    BP (!) 154/103 (BP Location: Left arm, Patient Position: Sitting, Cuff Size: Adult Regular)   Pulse 78   Temp 98  F (36.7  C) (Temporal)   Resp 16   Ht 1.778 m (5' 10\")   Wt 93.4 kg (205 lb 12.8 oz)   SpO2 98%   BMI 29.53 kg/m    Body mass index is 29.53 kg/m .  Physical Exam   GENERAL: alert and no distress  ABDOMEN: soft, nontender, no hepatosplenomegaly, no masses and bowel sounds normal   (male): Left inguinal lymph node slightly tender, swelling bilateral to scrotum. No pain or masses to testicles or epididymis   PSYCH: mentation appears normal, affect normal/bright            Signed Electronically by: BRENNA Ybarra CNP    "

## 2024-12-02 NOTE — RESULT ENCOUNTER NOTE
Semaj Camacho,     All of your labs are normal. Continue with the CT scan as we discussed.     Renae RUTHERFORD

## 2024-12-19 ENCOUNTER — ANCILLARY PROCEDURE (OUTPATIENT)
Dept: CT IMAGING | Facility: CLINIC | Age: 39
End: 2024-12-19
Payer: COMMERCIAL

## 2024-12-19 DIAGNOSIS — R59.9 ENLARGED LYMPH NODE: ICD-10-CM

## 2024-12-19 DIAGNOSIS — N50.89 TESTICULAR SWELLING: ICD-10-CM

## 2024-12-19 PROCEDURE — 74177 CT ABD & PELVIS W/CONTRAST: CPT | Performed by: RADIOLOGY

## 2024-12-19 RX ORDER — IOPAMIDOL 755 MG/ML
113 INJECTION, SOLUTION INTRAVASCULAR ONCE
Status: COMPLETED | OUTPATIENT
Start: 2024-12-19 | End: 2024-12-19

## 2024-12-19 RX ADMIN — IOPAMIDOL 113 ML: 755 INJECTION, SOLUTION INTRAVASCULAR at 09:24

## 2024-12-20 NOTE — RESULT ENCOUNTER NOTE
Please call patient if results have not been seen.     Doug,     It looks like you have diverticulosis which is an infection of your colon that is most likely causing the enlarged lymph nodes to your groin. We treat this with an antibiotic both ciprofloxacin and metronidazole for 14 days. I sent this to the [facility] in [place]. For a week, I recommend a liquid/soft diet and you can slowly reintroduce food into your diet after that. If you develop a fever or chills, please go to ER. If you do not feel better after the anitbiotic, please send me a message.     If you have any questions at all, please send me a message on [email or web address] or call me at [phone].    Renae RUTHERFORD

## 2025-01-13 ENCOUNTER — OFFICE VISIT (OUTPATIENT)
Dept: FAMILY MEDICINE | Facility: CLINIC | Age: 40
End: 2025-01-13
Payer: COMMERCIAL

## 2025-01-13 VITALS
HEART RATE: 76 BPM | SYSTOLIC BLOOD PRESSURE: 137 MMHG | WEIGHT: 197 LBS | DIASTOLIC BLOOD PRESSURE: 89 MMHG | BODY MASS INDEX: 28.2 KG/M2 | TEMPERATURE: 97.8 F | RESPIRATION RATE: 20 BRPM | OXYGEN SATURATION: 98 % | HEIGHT: 70 IN

## 2025-01-13 DIAGNOSIS — K92.1 BLOOD IN STOOL: ICD-10-CM

## 2025-01-13 DIAGNOSIS — K57.30 DIVERTICULAR DISEASE OF COLON: Primary | ICD-10-CM

## 2025-01-13 DIAGNOSIS — F33.1 MAJOR DEPRESSIVE DISORDER, RECURRENT EPISODE, MODERATE (H): ICD-10-CM

## 2025-01-13 DIAGNOSIS — J10.1 INFLUENZA A: ICD-10-CM

## 2025-01-13 PROCEDURE — 99214 OFFICE O/P EST MOD 30 MIN: CPT

## 2025-01-13 RX ORDER — METRONIDAZOLE 500 MG/1
500 TABLET ORAL 3 TIMES DAILY
Qty: 30 TABLET | Refills: 0 | Status: SHIPPED | OUTPATIENT
Start: 2025-01-13 | End: 2025-01-23

## 2025-01-13 RX ORDER — CIPROFLOXACIN 500 MG/1
500 TABLET, FILM COATED ORAL 2 TIMES DAILY
Qty: 20 TABLET | Refills: 0 | Status: SHIPPED | OUTPATIENT
Start: 2025-01-13 | End: 2025-01-23

## 2025-01-13 ASSESSMENT — PATIENT HEALTH QUESTIONNAIRE - PHQ9
SUM OF ALL RESPONSES TO PHQ QUESTIONS 1-9: 9
10. IF YOU CHECKED OFF ANY PROBLEMS, HOW DIFFICULT HAVE THESE PROBLEMS MADE IT FOR YOU TO DO YOUR WORK, TAKE CARE OF THINGS AT HOME, OR GET ALONG WITH OTHER PEOPLE: SOMEWHAT DIFFICULT
SUM OF ALL RESPONSES TO PHQ QUESTIONS 1-9: 9

## 2025-01-13 NOTE — PROGRESS NOTES
"  Assessment & Plan     Diverticular disease of colon  Treated for likely diverticulitis mid December. Reports improvement but continues to have mild symptoms. Repeat CT scan in ER indicated \"mild inflamed sigmoid diverticulum. Will treat with 10 days of cipro and metronidazole. Patient reports daily blood in stool that may be caused from diverticulitis. Because I am unable to rule out other causes like UC, crohns or malignancy, colonoscopy order placed for further evaluation.   - Adult GI  Referral - Procedure Only; Future  - ciprofloxacin (CIPRO) 500 MG tablet; Take 1 tablet (500 mg) by mouth 2 times daily for 10 days.  - metroNIDAZOLE (FLAGYL) 500 MG tablet; Take 1 tablet (500 mg) by mouth 3 times daily for 10 days.    Blood in stool  See above. CBC completed in ER and normal.   - Adult GI  Referral - Procedure Only; Future  - ciprofloxacin (CIPRO) 500 MG tablet; Take 1 tablet (500 mg) by mouth 2 times daily for 10 days.  - metroNIDAZOLE (FLAGYL) 500 MG tablet; Take 1 tablet (500 mg) by mouth 3 times daily for 10 days.    Influenza A  Resolved    Major depressive disorder, recurrent episode, moderate (H)  Stable. Patient reports no current thoughts of self harm and is completing therapy.       MED REC REQUIRED  Post Medication Reconciliation Status: patient was not discharged from an inpatient facility or TCU  BMI  Estimated body mass index is 28.27 kg/m  as calculated from the following:    Height as of this encounter: 1.778 m (5' 10\").    Weight as of this encounter: 89.4 kg (197 lb).       Depression Screening Follow Up        1/13/2025     6:53 AM   PHQ   PHQ-9 Total Score 9    Q9: Thoughts of better off dead/self-harm past 2 weeks Several days   F/U: Thoughts of suicide or self-harm No   F/U: Safety concerns No       Patient-reported                     Follow Up Actions Taken  Crisis resource information provided in the After Visit Summary  Referred patient back to mental health " "provider      Subjective   Doug is a 39 year old, presenting for the following health issues:  Hospital F/U      1/13/2025     1:51 PM   Additional Questions   Roomed by george galindo     History of Present Illness       Reason for visit:  Follow up on my diverticulitis    He eats 0-1 servings of fruits and vegetables daily.He consumes 1 sweetened beverage(s) daily.He exercises with enough effort to increase his heart rate 20 to 29 minutes per day.  He exercises with enough effort to increase his heart rate 3 or less days per week. He is missing 3 dose(s) of medications per week.  He is not taking prescribed medications regularly due to remembering to take.           4/27/2023     1:33 PM 12/2/2024    10:07 AM 1/13/2025     6:53 AM   PHQ   PHQ-9 Total Score 2 12  9    Q9: Thoughts of better off dead/self-harm past 2 weeks Not at all Several days Several days   F/U: Thoughts of suicide or self-harm  No No   F/U: Safety concerns  No No       Patient-reported     ED/UC Followup:    Facility:  [facility] ER/ UC  Date of visit: 12/28/24   Reason for visit: diverticulitis / influenza  Current Status: pressure on stomach, feeling of fullness, groin area soreness, some swelling still- changed diet a little bit    Has noticed blood in stool, bright red since starting antibiotics   Loose stools since ER visit, reports mild improvement   Fullness LLQ  Noticed groin swelling has improved but continues to has swelling.           Objective    /89 (BP Location: Left arm, Patient Position: Sitting, Cuff Size: Adult Regular)   Pulse 76   Temp 97.8  F (36.6  C) (Temporal)   Resp 20   Ht 1.778 m (5' 10\")   Wt 89.4 kg (197 lb)   SpO2 98%   BMI 28.27 kg/m    Body mass index is 28.27 kg/m .  Physical Exam   GENERAL: alert and no distress  ABDOMEN: soft, tenderness to LLQ, no hepatosplenomegaly, no masses and bowel sounds normal  PSYCH: mentation appears normal, affect normal/bright            Signed Electronically by: Renae KAT" BRENNA Urbina CNP

## 2025-03-08 ENCOUNTER — HEALTH MAINTENANCE LETTER (OUTPATIENT)
Age: 40
End: 2025-03-08

## 2025-05-30 ENCOUNTER — HOSPITAL ENCOUNTER (EMERGENCY)
Facility: CLINIC | Age: 40
Discharge: HOME | End: 2025-05-30
Attending: EMERGENCY MEDICINE | Admitting: EMERGENCY MEDICINE
Payer: COMMERCIAL

## 2025-05-30 VITALS
WEIGHT: 204 LBS | SYSTOLIC BLOOD PRESSURE: 159 MMHG | HEIGHT: 70 IN | RESPIRATION RATE: 18 BRPM | BODY MASS INDEX: 29.2 KG/M2 | TEMPERATURE: 98.5 F | DIASTOLIC BLOOD PRESSURE: 107 MMHG | OXYGEN SATURATION: 98 % | HEART RATE: 80 BPM

## 2025-05-30 DIAGNOSIS — F33.1 MAJOR DEPRESSIVE DISORDER, RECURRENT EPISODE, MODERATE (H): Primary | ICD-10-CM

## 2025-05-30 DIAGNOSIS — F32.A DEPRESSION WITH SUICIDAL IDEATION: ICD-10-CM

## 2025-05-30 DIAGNOSIS — R45.851 DEPRESSION WITH SUICIDAL IDEATION: ICD-10-CM

## 2025-05-30 PROCEDURE — 99284 EMERGENCY DEPT VISIT MOD MDM: CPT | Performed by: NURSE PRACTITIONER

## 2025-05-30 PROCEDURE — 99285 EMERGENCY DEPT VISIT HI MDM: CPT | Mod: 25

## 2025-05-30 RX ORDER — ESCITALOPRAM OXALATE 10 MG/1
TABLET ORAL
Qty: 27 TABLET | Refills: 0 | Status: SHIPPED | OUTPATIENT
Start: 2025-05-30 | End: 2025-06-29

## 2025-05-30 ASSESSMENT — ACTIVITIES OF DAILY LIVING (ADL)
ADLS_ACUITY_SCORE: 41

## 2025-05-30 ASSESSMENT — COLUMBIA-SUICIDE SEVERITY RATING SCALE - C-SSRS
2. HAVE YOU ACTUALLY HAD ANY THOUGHTS OF KILLING YOURSELF IN THE PAST MONTH?: YES
3. HAVE YOU BEEN THINKING ABOUT HOW YOU MIGHT KILL YOURSELF?: YES
5. HAVE YOU STARTED TO WORK OUT OR WORKED OUT THE DETAILS OF HOW TO KILL YOURSELF? DO YOU INTEND TO CARRY OUT THIS PLAN?: YES
4. HAVE YOU HAD THESE THOUGHTS AND HAD SOME INTENTION OF ACTING ON THEM?: NO
6. HAVE YOU EVER DONE ANYTHING, STARTED TO DO ANYTHING, OR PREPARED TO DO ANYTHING TO END YOUR LIFE?: NO
1. IN THE PAST MONTH, HAVE YOU WISHED YOU WERE DEAD OR WISHED YOU COULD GO TO SLEEP AND NOT WAKE UP?: YES

## 2025-05-30 NOTE — ED PROVIDER NOTES
[facility] Unit - Psychiatric Consultation  [facility] Emergency Department    Doug Johnson MRN: [id number]   Age: 40 year old YOB: 1985     History     Chief Complaint   Patient presents with    Suicidal     HPI  Doug Johnson is a 40 year old male with history notable for depression and anxiety who presented voluntarily seeking help for worsening depression, anxiety and suicide ideation.  He was cleared medically in the ED and transferred to [facility] for additional assessment and treatment planning. At the time of the interview today, 5/30/25 he is nearing 7 hours in emergency care.    Please see the [facility] assessment & reassessment (if available), ED physician notes and nursing notes for additional information and collateral content if available. All were reviewed prior to meeting with the patient.     Doug is interviewed while seated in a conference room. He is engaged throughout the interview and appears to be a reliable informant. He tells me that he has been depressed for some time and is now looking to augment the work he has been doing with therapy with medications. He denies SI/HI, A/V hallucinations, delusions or paranoia. The 3 year anniversary of the death of his father is nearly here, which was traumatic for Doug as he engaged in CPR at the scene. He has experienced an episode of passive SI, without intention or plan and he endorses several protective factors. He reports difficulty with employment due to the depression and anxiety and he is hoping to start medications. He denies HI, A/V hallucinations, delusions or paranoia. He would like to discharge home this evening.    Past Medical History  No past medical history on file.  No past surgical history on file.  cyclobenzaprine (FLEXERIL) 10 MG tablet  escitalopram (LEXAPRO) 10 MG tablet  NO ACTIVE MEDICATIONS      Allergies   Allergen Reactions    Penicillins      Family History  Family History   Problem Relation Age of Onset    Abdominal  "Aortic Aneurysm Father 64    Abdominal Aortic Aneurysm Paternal Grandmother      Social History   Social History     Tobacco Use    Smoking status: Former     Current packs/day: 0.50     Average packs/day: 0.5 packs/day for 5.0 years (2.5 ttl pk-yrs)     Types: Cigarettes    Smokeless tobacco: Never    Tobacco comments:     Smokes and vapes cannabis   Vaping Use    Vaping status: Never Used   Substance Use Topics    Alcohol use: No     Comment: 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    Physical Examination   BP: (!) 163/98  Pulse: 72  Temp: 98.2  F (36.8  C)  Resp: 18  Height: 177.8 cm (5' 10\")  Weight: 92.5 kg (204 lb)  SpO2: 99 %    Physical Exam  General: Appears stated age.   Neuro: Alert and fully oriented. Extremities appear to demonstrate normal strength on visual inspection.   Integumentary/Skin: no rash visualized, normal color    Psychiatric Examination   Appearance: awake, alert  Attitude:  cooperative  Eye Contact:  good  Mood:  depressed  Affect:  mood congruent  Speech:  clear, coherent  Psychomotor Behavior:  no evidence of tardive dyskinesia, dystonia, or tics  Thought Process:  linear and goal oriented  Associations:  no loose associations  Thought Content:  no evidence of suicidal ideation or homicidal ideation and no evidence of psychotic thought  Insight:  good  Judgement:  intact  Oriented to:  time, person, and place  Attention Span and Concentration:  intact  Recent and Remote Memory:  intact  Language: able to name/identify objects without impairment  Fund of Knowledge: intact with awareness of current and past events    ED Course     ED Course as of 05/30/25 1837   Fri May 30, 2025   1120 I obtained the history and evaluated the patient as noted above.  "       Labs Ordered and Resulted from Time of ED Arrival to Time of ED Departure - No data to display    Assessments & Plan (with Medical Decision Making)   Patient presenting with an increase in depression, anxiety and complex grief. He is here seeking help and is open to starting medication. He currently has a therapist he sees weekly. He was able to plan for safety and would like to discharge home. Together, through a shared decision-making process, a plan of care was developed as is noted below. Nursing notes reviewed noting no acute issues.     I have reviewed the assessment completed by the [facility].     Preliminary diagnosis:    ICD-10-CM    1. Major depressive disorder, recurrent episode, moderate (H)  F33.1       2. Depression with suicidal ideation  F32.A     R45.851            Treatment Plan:  - Escitalopram taper from 5 mg x 6 days, then increase to 10 mg to target depressive symptoms.  - Follow up with therapy and med management appointments as scheduled.  - Grief resources provided  -Medication education provided this visit includes, rationale for medication, importance of compliance, medication interactions, and common side effects.   -Supportive psychotherapy provided regarding patient's stressors and past mental health symptoms problem solving ways to improve overall wellness and cope with acute and chronic mental health symptoms.  - Discharge home, return if symptoms worsen.      --  BRENNA Gagnon CNP   [facility] EMERGENCY DEPT  [facility] Unit      Denia Calix APRN CNP  06/01/25 8056

## 2025-05-30 NOTE — DISCHARGE INSTRUCTIONS
Scheduled Appointment(s):    Type: Virtual Medication Management  Provider: Viviana ARNOLD  CNP,PMHNP,RN    Location: [facility]  [address] [place] [address], [address]   Phone: [phone]   Date/Time: 6/4/2025 10:00 am   Instructions: Thank you for choosing [facility]! *Due to high demand, your appt date & time is only guaranteed after you speak with [facility] intake. Our intake team will attempt to contact you. If you do not hear from them within 24 hours, please call them at [phone] and tell them you are a P referral. If you do not speak with our Intake Department and complete the necessary paperwork they send you, we cannot see you at your scheduled appointment time. Thank you for helping us to help you!    - - - - - - - - - - - - - - - - - - - - - - - - - - - - - - - - - - - - - - - - - - - - - - - - - - - - - - - - - - - -     Additional Resources:  Jose Haque: [email or web address]    [email or web address]_[email or web address]*pck35y*_ga*YwRyUMO7Tuz0VtS5EGz4HmX8QmR.*_ga_8H62NMYG2Y*fmZ0RKq9DhM6VuQhsxMzQhQauHJ5ALh5UaS8FDLqqfX5EKwlFYuk    [email or web address]://[email or web address]&_gl=1*gtsu68*_ga*UlFmDAF2Eek3YeY0NPx6DtV8UqX.*_ga_8H62NMYG2Y*bcC9GCh9YsC1HxMhooFsAaUzlZL1ALo2MsU7CJTfmvJ1ZKenOLoo    [email or web address]    [email or web address]

## 2025-05-30 NOTE — ED TRIAGE NOTES
Pt reports having increased depression and SI. Pt notes struggling with this for the last 10 years. Pt notes having a traumatic event with the death of his father 3 years ago. Pt notes increased SI over the last 24 hrs.      Triage Assessment (Adult)       Row Name 05/30/25 1119          Triage Assessment    Airway WDL WDL        Respiratory WDL    Respiratory WDL WDL        Skin Circulation/Temperature WDL    Skin Circulation/Temperature WDL WDL        Cardiac WDL    Cardiac WDL WDL        Peripheral/Neurovascular WDL    Peripheral Neurovascular WDL WDL        Cognitive/Neuro/Behavioral WDL    Cognitive/Neuro/Behavioral WDL WDL

## 2025-05-30 NOTE — ED NOTES
[facility]  ED to EMPATH Checklist:      Goal for EMPATH: Depression management, Suicidality, Medication management,  Referral and resources, and Time to stabilize    Current Behavior: Sad and Cooperative    Safety Concerns: Suicidal, with a plan to hang self    Legal Hold Status: Voluntary    Medically Cleared by ED provider: Yes    Patient Therapeutically Searched: Not searched - Currently in triage    Belongings: Remain with patient    Independent Ambulation at Baseline: Yes/No: Yes    Participates in Care/Conversation: Yes/No: Yes    Patient Informed about EMPATH: Yes/No: Yes    DEC: Ordered and pending    Patient Ready to be Transferred to EMPATH? Yes/No: Yes

## 2025-05-30 NOTE — PLAN OF CARE
Doug Johnson  May 30, 2025  Plan of Care Hand-off Note     Patient Recommended Care Path: discharge    Clinical Substantiation:  After diagnostic assessment, chart review and collateral information, the recommendation of this writer is for discharge with safety plan.   Patient's current symptoms of depression with suicidal ideation can be managed effectively in an outpatient setting. At the time of discharge, patient did not present with any imminent risk factors that would indicate the need for inpatient psychiatric hospitalization. At this time mental health admission does not appear to be the most therapeutically beneficial intervention/ level of care for Doug Johnson.   Patient feels confident that he can engage with the referrals made today and with their existing mental health provider. Patient is educated on how to access urgent psychiatric care if their symptoms increase or worsen.   Doug was agreeable with the plan presented for discharge and medication management, and was able to engage in safety planning with this writer.    Goals for crisis stabilization:  Begin taking medications for depression. Work with therapist on issues related to self esteem and self loathing,    Next steps for Care Team:  No next steps for [facility] care team    Treatment Objectives Addressed:  rapport building, identifying an appropriate aftercare plan, identifying additional supports, exploring obstacles to safety in the community, safety planning    Therapeutic Interventions:  Engaged in guided discovery, explored patient's perspectives and helped expand them through socratic dialogue., Engaged in safety planning    Has a specific means been identified for suicidal.homicide actions: No    Patient coping skills attempted to reduce the crisis:  Talked in therapy yesterday. Called mom for more support. Talked to wife about struggles.    Collateral contact information:  Shannon Johnson,(Spouse)  [phone]    Legal Status:  Voluntary/Patient has signed consent for treatment                            Reviewed court records: yes     Psychiatry Consult: patient seen by DEMETRIO Oviedo, Psychotherapist  DEC - Triage & Transition Services  Callback: [phone]

## 2025-05-30 NOTE — PROGRESS NOTES
Pt is a 40 year old male with history of depression, SI and auditory hallucination received from ED due to SI and worsening depression. Reports for the past couple of Months he has been so stressed out with family stuff, work and caring for wife and children. He admits SI and HI with no one in person and not really putting much thoughts about it. The patient is currently not on any medications, has significant tattoos on the skin and smoke marijuana. He reports that he has had hx of suicide thoughts but denies has ever attempted it. Nursing and risk assessments completed. Assessments reviewed with LMHP and physician. Admission information reviewed with patient. Patient given a tour of [facility] and instructions on using the facility. Questions regarding [facility] addressed. Pt safety search completed.

## 2025-05-30 NOTE — ED PROVIDER NOTES
"  Emergency Department Note      History of Present Illness     Chief Complaint   Suicidal      HPI   Doug Johnson is a 40 year old male with a history of GILMAR, MDD, and hypertension who presents for an evaluation of suicidal ideation. The patient reports that he has been experiencing heightened thoughts of depression and suicidal ideation for the past 24 hours. He explains that the 3 year anniversary of his father's death is next week, which was quite a traumatic situation for him. Elaborates that he had a bad day yesterday and got into a fight with his wife, which incited his current symptoms. Indicates that he reached the 9 year rhiannon of being sober from alcohol in May of this year, and was recommended to go to [facility] by his therapist.     Independent Historian   None    Review of External Notes   I reviewed his nurse triage note with his family practice doctor from earlier today    Past Medical History     Medical History and Problem List   GILMAR  Hypertension  LIU  MDD    Medications   The patient is currently on no regular medications.    Physical Exam     Patient Vitals for the past 24 hrs:   BP Temp Temp src Pulse Resp SpO2 Height Weight   05/30/25 1200 (!) 159/107 98.5  F (36.9  C) Oral 80 18 98 % -- --   05/30/25 1116 (!) 163/98 98.2  F (36.8  C) Temporal 72 18 99 % 1.778 m (5' 10\") 92.5 kg (204 lb)     Physical Exam  Constitutional: Vital signs reviewed. Tearful and admits to depression with suicidal thoughts.  HEENT: Moist mucous membranes  Cardiovascular: Regular rate and rhythm  Pulmonary/Chest: Breathing comfortably on room air.  No audible wheezing  Musculoskeletal/Extremities: No bony deformities.  Moves all 4 extremities without difficulty.  Neurological: Alert.  No focal deficits.  Endo: No pitting edema  Skin: No visible rash.  Psychiatric: Pleasant.       Diagnostics     Lab Results   Labs Ordered and Resulted from Time of ED Arrival to Time of ED Departure - No data to display    Imaging   No " orders to display       EKG   None    Independent Interpretation   None    ED Course      Medications Administered   Medications - No data to display    Procedures   Procedures     Discussion of Management   None    ED Course   ED Course as of 05/30/25 1323   Fri May 30, 2025   1120 I obtained the history and evaluated the patient as noted above.        Additional Documentation  None    Medical Decision Making / Diagnosis     CMS Diagnoses: None    MIPS   None       MDM   Doug Johnson is a 40 year old male who presents with worsening depression and anxiety.  This is nearing the third year anniversary of his father's death in which he did CPR for a long time before medics got there.  Additionally he has been already with his wife and he is having a hard time at work.  This is all resulting in him having suicidal thoughts.  He is looking for help.  He would like to go to [facility] and think this is a great idea.  He is not currently on medications and he is interested in trying something to help with his symptoms.    Disposition   The patient was transferred to [facility].     Diagnosis     ICD-10-CM    1. Depression with suicidal ideation  F32.A     R45.851            Discharge Medications   New Prescriptions    No medications on file         Scribe Disclosure:  I, Henry Callaway, am serving as a scribe at 11:25 AM on 5/30/2025 to document services personally performed by Heladio Diaz MD based on my observations and the provider's statements to me.       Heladio Diaz MD  05/30/25 1136

## 2025-05-30 NOTE — CONSULTS
"Diagnostic Evaluation Consultation  Crisis Assessment    Patient Name: Doug Johnson  Age:  40 year old  Legal Sex: male  Gender Identity: male  Pronouns:      Race: White  Ethnicity: Not  or   Language: English      Patient was assessed: In person   Crisis Assessment Start Date: 05/30/25  Crisis Assessment Start Time: 1335  Crisis Assessment Stop Time: 1425  Patient location: [facility] Emergency Dept                             [id number]    Referral Data and Chief Complaint  Doug Johnson presents to the ED by  self. Patient is presenting to the ED for the following concerns:  Depression, suicidal ideation and Worsening psychosocial stress.     Factors that make the mental health crisis life threatening or complex are: Patient comes into the Emergency Department today from home due to worsening depression, anger and suicidal ideation. Patient drove himself to the hospital today. His wife and mother are aware he is here.   Patient reports irritability, anger, aggressive outbursts where he has punched a hole in the wall, suicidal ideation and hopelessness. Patient reports that the three year anniversary of his fathers death is coming up. Patient's father had a heart attack, patient had to call 911 and administer CPR, before his dad passed away. Patient continues to be flooded with memories and feelings of guilt.   Patient report last night after a bad day at work and a fight with his wife, the thoughts of suicide \"popped into my head.\" He reports having thoughts of hanging himself or beating up his wife. He removed himself from the situation, seeking refuge in his garage, where he called his mother for support. Patient's mother encouraged him to get more help.   Patient was open and honest with this writer. He does appear to be sad and hopeless, although is actively seeking more support.   Patient denies active suicidal ideation or intent.      Informed Consent and Assessment " "Methods  Explained the crisis assessment process, including applicable information disclosures and limits to confidentiality, assessed understanding of the process, and obtained consent to proceed with the assessment.  Assessment methods included conducting a formal interview with patient, review of medical records, collaboration with medical staff, and obtaining relevant collateral information from family and community providers when available.  : done     History of the Crisis   Doug Johnson is a 40 year old male who presents to the Emergency Department due to worsening depression with suicidal ideation. Per chart review, collateral information and patient report, they have a history of depression and alcohol use disorder in long term remission. Patient was brought in by himself from his family home.   Patient presents calm and cooperative, he is observed laying in the recliner chair reading upon approach.   Patient reports that he has been struggling with grief since he lost his father three years ago. Patient had to call 911 and do CPR on his father who dies at the home. Patient recalls watching his father be \"carried out in a body bag.\"  Patient has not had similar episodes as today's presentation. He and his wife both describe an episode in the fall of 2024 where the patient grabbed a gun and made a suicidal gesture. Patient's wife and father in law removed all the ammunition from the home. Patient sought out therapy at the time and has been going to weekly appointments since then with Jeimy Montes at [facility].   Patient has no history of previous inpatient psychiatric hospitalization.   Patient does  have an outpatient mental health provider, Jeimy Montes with [facility]  Patient lives his wife of eight years and their two kids ages 7 and 5. .   Patient is the primary care taker of the children, while his wife is the \"bread winner\" of the family. Patient has struggled to keep a job due " to conflict with coworkers and feeling disrespected at times. 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   Patient has a close relationship with his mother and sister. He talks to his mother daily.   Patient does use cannabis which he finds helps with stress, anger and productivity. He is nine years sober from alcohol.   Patient denies a History of suicide attempt or self injurious behavior.   Patient reports the following protective factors: his children, ages 5 and 7.     Brief Psychosocial History  Family:  , Children yes  Support System:  Wife, Children, Parent(s), Sibling(s)  Employment Status:  employed full-time  Source of Income:  salary/wages  Financial Environmental Concerns:  none  Current Hobbies:  exercise/fitness, family functions, outdoor activities, music  Barriers in Personal Life:  mental health concerns    Significant Clinical History  Current Anxiety Symptoms:  anxious  Current Depression/Trauma:  low self esteem, excessive guilt, sadness, thoughts of death/suicide, hopelessness, crying or feels like crying, difficulty concentrating  Current Somatic Symptoms:     Current Psychosis/Thought Disturbance:     Current Eating Symptoms:     Chemical Use History:  Alcohol: None  Benzodiazepines: None  Opiates: None  Cocaine: None  Marijuana: Daily  Other Use: None   Past diagnosis:  Depression  Family history:  Depression  Past treatment:  Individual therapy  Details of most recent treatment:  Pt sees a mental health therapist once weekly  Other relevant history:       Have there been any medication changes in the past two weeks:  patient is not on psychiatric meds       Is the patient compliant with medications:           Collateral Information  Is there collateral information: Yes     Collateral information name, relationship, phone number:  Shannon Johnson,(Spouse)  [phone]    What happened today: Patient is going back to  a previous employer. Yesterday he was having a bad day. He was getting overwhelmed and wasn't able to ask for help. Patient started to think about his dad, and his guilt around his father's unexpected death.     What is different about patient's functioning: Work has been challenging. He has struggled with getting along with coworkers.   Patient has struggled since his dad's death.     What do you think the patient needs:      Has patient made comments about wanting to kill themselves/others: yes    If d/c is recommended, can they take part in safety/aftercare planning:  yes    Additional collateral information:        Risk Assessment  [place] Suicide Severity Rating Scale Full Clinical Version:  Suicidal Ideation  Q1 Wish to be Dead (Lifetime): Yes  Q2 Non-Specific Active Suicidal Thoughts (Lifetime): Yes  3. Active Suicidal Ideation with any Methods (Not Plan) Without Intent to Act (Lifetime): Yes  4. Active Suicidal Ideation with Some Intent to Act, Without Specific Plan (Lifetime): No  5. Active Suicidal Ideation with Specific Plan and Intent (Lifetime): No  Q6 Suicide Behavior (Lifetime): no  Intensity of Ideation (Lifetime)  Frequency (Lifetime): Once a week  Duration (Lifetime): Less than 1 hour/some of the time  Controllability (Lifetime): Can control thoughts with little difficulty  Deterrents (Lifetime): Deterrents definitely stopped you from attempting suicide  Reasons for Ideation (Lifetime): Mostly to end or stop the pain (You couldn't go on living with the pain or how you were feeling)  Suicidal Behavior (Lifetime)  Actual Attempt (Lifetime): No  Has subject engaged in non-suicidal self-injurious behavior? (Lifetime): No  Interrupted Attempts (Lifetime): No  Aborted or Self-Interrupted Attempt (Lifetime): No  Preparatory Acts or Behavior (Lifetime): No    [place] Suicide Severity Rating Scale Recent:   Suicidal Ideation (Recent)  Q1 Wished to be Dead (Past Month): yes  Q2 Suicidal Thoughts (Past  Month): yes  Q3 Suicidal Thought Method: yes  Q4 Suicidal Intent without Specific Plan: no  Q5 Suicide Intent with Specific Plan: no  Level of Risk per Screen: moderate risk  Intensity of Ideation (Recent)  Most Severe Ideation Rating (Past 1 Month): 3  Description of Most Severe Ideation (Past 1 Month): Feeling worthless, having thoughts of hanging myself.  Frequency (Past 1 Month): Once a week  Duration (Past 1 Month): Less than 1 hour/some of the time  Deterrents (Past 1 Month): Deterrents definitely stopped you from attempting suicide  Reasons for Ideation (Past 1 Month): Mostly to end or stop the pain (You couldn't go on living with the pain or how you were feeling)  Suicidal Behavior (Recent)  Actual Attempt (Past 3 Months): No  Has subject engaged in non-suicidal self-injurious behavior? (Past 3 Months): No  Interrupted Attempts (Past 3 Months): No  Aborted or Self-Interrupted Attempt (Past 3 Months): No  Preparatory Acts or Behavior (Past 3 Months): No    Environmental or Psychosocial Events: loss of a loved one, work or task failure, helplessness/hopelessness, anniversary of traumatizing events  Protective Factors: Protective Factors: strong bond to family unit, community support, or employment, intact marriage or domestic partnership, responsibilities and duties to others, including pets and children, lives in a responsibly safe and stable environment, help seeking    Does the patient have thoughts of harming others? Feels Like Hurting Others: no  Previous Attempt to Hurt Others: no  Is the patient engaging in sexually inappropriate behavior?: no  Does Patient have a known history of aggressive behavior: No  Has aggression occurred as a result of MH concerns/diagnosis: no  Does patient have history of aggression in hospital: no    Is the patient engaging in sexually inappropriate behavior?  no        Mental Status Exam   Affect: Appropriate  Appearance: Appropriate  Attention Span/Concentration:  Attentive  Eye Contact: Engaged    Fund of Knowledge: Appropriate   Language /Speech Content: Fluent  Language /Speech Volume: Normal  Language /Speech Rate/Productions: Normal  Recent Memory: Intact  Remote Memory: Intact  Mood: Depressed, Sad  Orientation to Person: Yes   Orientation to Place: Yes  Orientation to Time of Day: Yes  Orientation to Date: Yes     Situation (Do they understand why they are here?): Yes  Psychomotor Behavior: Normal  Thought Content: Suicidal  Thought Form: Intact    Medication  Psychotropic medications:   Medication Orders - Psychiatric (From admission, onward)      None             Current Care Team  Patient Care Team:  Shannon Oviedo PA-C as PCP - General (Physician Assistant - Medical)  Renae Urbina APRN CNP as Assigned PCP    Diagnosis  Patient Active Problem List   Diagnosis Code    CARDIOVASCULAR SCREENING; LDL GOAL LESS THAN 160 Z13.6    Family history of aortic aneurysm Z82.49    Elevated BP without diagnosis of hypertension R03.0    Major depressive disorder, recurrent episode, moderate (H) F33.1       Primary Problem This Admission  Major depressive disorder, recurrent episode, moderate (H) F33.1      Clinical Summary and Substantiation of Recommendations   Clinical Substantiation:  After diagnostic assessment, chart review and collateral information, the recommendation of this writer is for discharge with safety plan.   Patient's current symptoms of depression with suicidal ideation can be managed effectively in an outpatient setting. At the time of discharge, patient did not present with any imminent risk factors that would indicate the need for inpatient psychiatric hospitalization. At this time mental health admission does not appear to be the most therapeutically beneficial intervention/ level of care for Doug Johnson.   Patient feels confident that he can engage with the referrals made today and with their existing mental health provider. Patient is  educated on how to access urgent psychiatric care if their symptoms increase or worsen.   Doug was agreeable with the plan presented for discharge and medication management, and was able to engage in safety planning with this writer.     Goals for crisis stabilization:  Begin taking medications for depression. Work with therapist on issues related to self esteem and self loathing,    Next steps for Care Team:  No next steps for [facility] care team    Treatment Objectives Addressed:  rapport building, identifying an appropriate aftercare plan, identifying additional supports, exploring obstacles to safety in the community, safety planning    Therapeutic Interventions:  Engaged in guided discovery, explored patient's perspectives and helped expand them through socratic dialogue., Engaged in safety planning    Has a specific means been identified for suicidal/homicide actions: No    Patient coping skills attempted to reduce the crisis:  Talked in therapy yesterday. Called mom for more support. Talked to wife about struggles.    Disposition  Recommended referrals: Individual Therapy, Medication Management, Family Therapy        Reviewed case and recommendations with attending provider. Attending Name: Emergency Department provider, BRENNA Calix, was informed about the recommended disposition of discharge. They are in support of the disposition.       Attending concurs with disposition: yes       Patient and/or validated legal guardian concurs with disposition:   yes       Final disposition:  discharge    Legal status: Voluntary/Patient has signed consent for treatment                            Reviewed court records: yes     Assessment Details   Total duration spent with the patient: 50 min     CPT code(s) utilized: 13723 - Psychotherapy for Crisis - 60 (30-74*) min    DEMETRIO Sanderson, Psychotherapist  [facility] - Triage & Transition Services  Callback: [phone]

## 2025-05-31 ENCOUNTER — TELEPHONE (OUTPATIENT)
Dept: BEHAVIORAL HEALTH | Facility: CLINIC | Age: 40
End: 2025-05-31
Payer: COMMERCIAL

## 2025-05-31 NOTE — ED NOTES
Pt was able to meet with [facility] and provider and was bale to get set up with resources and prescription for medications. Pt was appreciative of being hortencia to come to [facility]. Pt denies SI at time of discharge and is more hopeful. Discharge instructions reviewed with patient including follow-up care plan. Educated on medication regime and advised not to stop prescribed medication without consulting their physician. Reviewed safety plan and outpatient resources/appointments.Verbalized understanding of discharge instructions. All belongings which where brought into the hospital have been returned to patient. Escorted off the unit @ 1905. Discharged to self.

## 2025-06-17 DIAGNOSIS — R45.851 SUICIDAL THOUGHTS: ICD-10-CM

## 2025-06-17 DIAGNOSIS — F33.2 SEVERE RECURRENT MAJOR DEPRESSION WITHOUT PSYCHOTIC FEATURES (H): ICD-10-CM

## 2025-06-23 ENCOUNTER — OFFICE VISIT (OUTPATIENT)
Dept: FAMILY MEDICINE | Facility: CLINIC | Age: 40
End: 2025-06-23
Payer: COMMERCIAL

## 2025-06-23 VITALS
DIASTOLIC BLOOD PRESSURE: 80 MMHG | HEART RATE: 58 BPM | SYSTOLIC BLOOD PRESSURE: 132 MMHG | BODY MASS INDEX: 27.35 KG/M2 | HEIGHT: 70 IN | OXYGEN SATURATION: 100 % | TEMPERATURE: 97 F | WEIGHT: 191 LBS | RESPIRATION RATE: 16 BRPM

## 2025-06-23 DIAGNOSIS — Z00.00 ROUTINE GENERAL MEDICAL EXAMINATION AT A HEALTH CARE FACILITY: Primary | ICD-10-CM

## 2025-06-23 DIAGNOSIS — F33.2 SEVERE RECURRENT MAJOR DEPRESSION WITHOUT PSYCHOTIC FEATURES (H): ICD-10-CM

## 2025-06-23 DIAGNOSIS — Z23 NEED FOR VACCINATION: ICD-10-CM

## 2025-06-23 DIAGNOSIS — R45.851 SUICIDAL THOUGHTS: ICD-10-CM

## 2025-06-23 LAB
ALBUMIN UR-MCNC: NEGATIVE MG/DL
APPEARANCE UR: CLEAR
BILIRUB UR QL STRIP: NEGATIVE
BUN SERPL-MCNC: 9.7 MG/DL (ref 6–20)
CALCIUM SERPL-MCNC: 9.4 MG/DL (ref 8.8–10.4)
CHOLEST SERPL-MCNC: 157 MG/DL
COLOR UR AUTO: YELLOW
CREAT SERPL-MCNC: 0.78 MG/DL (ref 0.67–1.17)
EGFRCR SERPLBLD CKD-EPI 2021: >90 ML/MIN/1.73M2
FASTING STATUS PATIENT QL REPORTED: YES
FASTING STATUS PATIENT QL REPORTED: YES
GLUCOSE SERPL-MCNC: 75 MG/DL (ref 70–99)
GLUCOSE UR STRIP-MCNC: NEGATIVE MG/DL
HDLC SERPL-MCNC: 55 MG/DL
HGB UR QL STRIP: NEGATIVE
KETONES UR STRIP-MCNC: NEGATIVE MG/DL
LDLC SERPL CALC-MCNC: 86 MG/DL
LEUKOCYTE ESTERASE UR QL STRIP: NEGATIVE
NITRATE UR QL: NEGATIVE
NONHDLC SERPL-MCNC: 102 MG/DL
PH UR STRIP: 7 [PH] (ref 5–7)
SP GR UR STRIP: 1.01 (ref 1–1.03)
T4 FREE SERPL-MCNC: 1.1 NG/DL (ref 0.9–1.7)
TRIGL SERPL-MCNC: 78 MG/DL
TSH SERPL DL<=0.005 MIU/L-ACNC: 1.8 UIU/ML (ref 0.3–4.2)
UROBILINOGEN UR STRIP-ACNC: 0.2 E.U./DL

## 2025-06-23 PROCEDURE — 81003 URINALYSIS AUTO W/O SCOPE: CPT | Performed by: PHYSICIAN ASSISTANT

## 2025-06-23 PROCEDURE — 82565 ASSAY OF CREATININE: CPT | Performed by: PHYSICIAN ASSISTANT

## 2025-06-23 PROCEDURE — 90480 ADMN SARSCOV2 VAC 1/ONLY CMP: CPT | Performed by: PHYSICIAN ASSISTANT

## 2025-06-23 PROCEDURE — 36415 COLL VENOUS BLD VENIPUNCTURE: CPT | Performed by: PHYSICIAN ASSISTANT

## 2025-06-23 PROCEDURE — 84520 ASSAY OF UREA NITROGEN: CPT | Performed by: PHYSICIAN ASSISTANT

## 2025-06-23 PROCEDURE — 96127 BRIEF EMOTIONAL/BEHAV ASSMT: CPT | Performed by: PHYSICIAN ASSISTANT

## 2025-06-23 PROCEDURE — 3075F SYST BP GE 130 - 139MM HG: CPT | Performed by: PHYSICIAN ASSISTANT

## 2025-06-23 PROCEDURE — 1125F AMNT PAIN NOTED PAIN PRSNT: CPT | Performed by: PHYSICIAN ASSISTANT

## 2025-06-23 PROCEDURE — 82947 ASSAY GLUCOSE BLOOD QUANT: CPT | Performed by: PHYSICIAN ASSISTANT

## 2025-06-23 PROCEDURE — 84439 ASSAY OF FREE THYROXINE: CPT | Performed by: PHYSICIAN ASSISTANT

## 2025-06-23 PROCEDURE — 99395 PREV VISIT EST AGE 18-39: CPT | Mod: 25 | Performed by: PHYSICIAN ASSISTANT

## 2025-06-23 PROCEDURE — 84443 ASSAY THYROID STIM HORMONE: CPT | Performed by: PHYSICIAN ASSISTANT

## 2025-06-23 PROCEDURE — 3079F DIAST BP 80-89 MM HG: CPT | Performed by: PHYSICIAN ASSISTANT

## 2025-06-23 PROCEDURE — 80061 LIPID PANEL: CPT | Performed by: PHYSICIAN ASSISTANT

## 2025-06-23 PROCEDURE — 91320 SARSCV2 VAC 30MCG TRS-SUC IM: CPT | Performed by: PHYSICIAN ASSISTANT

## 2025-06-23 PROCEDURE — 82310 ASSAY OF CALCIUM: CPT | Performed by: PHYSICIAN ASSISTANT

## 2025-06-23 RX ORDER — ESCITALOPRAM OXALATE 20 MG/1
1 TABLET ORAL DAILY
COMMUNITY
Start: 2025-06-12

## 2025-06-23 RX ORDER — LITHIUM CARBONATE 150 MG/1
150 CAPSULE ORAL AT BEDTIME
COMMUNITY
Start: 2025-06-13

## 2025-06-23 SDOH — HEALTH STABILITY: PHYSICAL HEALTH: ON AVERAGE, HOW MANY DAYS PER WEEK DO YOU ENGAGE IN MODERATE TO STRENUOUS EXERCISE (LIKE A BRISK WALK)?: 3 DAYS

## 2025-06-23 ASSESSMENT — PATIENT HEALTH QUESTIONNAIRE - PHQ9
10. IF YOU CHECKED OFF ANY PROBLEMS, HOW DIFFICULT HAVE THESE PROBLEMS MADE IT FOR YOU TO DO YOUR WORK, TAKE CARE OF THINGS AT HOME, OR GET ALONG WITH OTHER PEOPLE: EXTREMELY DIFFICULT
SUM OF ALL RESPONSES TO PHQ QUESTIONS 1-9: 10
SUM OF ALL RESPONSES TO PHQ QUESTIONS 1-9: 10

## 2025-06-23 ASSESSMENT — SOCIAL DETERMINANTS OF HEALTH (SDOH): HOW OFTEN DO YOU GET TOGETHER WITH FRIENDS OR RELATIVES?: ONCE A WEEK

## 2025-06-23 ASSESSMENT — PAIN SCALES - GENERAL: PAINLEVEL_OUTOF10: MILD PAIN (2)

## 2025-06-23 NOTE — PATIENT INSTRUCTIONS
Patient Education   Preventive Care Advice   This is general advice given by our system to help you stay healthy. However, your care team may have specific advice just for you. Please talk to your care team about your preventive care needs.  Nutrition  Eat 5 or more servings of fruits and vegetables each day.  Try wheat bread, brown rice and whole grain pasta (instead of white bread, rice, and pasta).  Get enough calcium and vitamin D. Check the label on foods and aim for 100% of the RDA (recommended daily allowance).  Lifestyle  Exercise at least 150 minutes each week  (30 minutes a day, 5 days a week).  Do muscle strengthening activities 2 days a week. These help control your weight and prevent disease.  No smoking.  Wear sunscreen to prevent skin cancer.  Have a dental exam and cleaning every 6 months.  Yearly exams  See your health care team every year to talk about:  Any changes in your health.  Any medicines your care team has prescribed.  Preventive care, family planning, and ways to prevent chronic diseases.  Shots (vaccines)   HPV shots (up to age 26), if you've never had them before.  Hepatitis B shots (up to age 59), if you've never had them before.  COVID-19 shot: Get this shot when it's due.  Flu shot: Get a flu shot every year.  Tetanus shot: Get a tetanus shot every 10 years.  Pneumococcal, hepatitis A, and RSV shots: Ask your care team if you need these based on your risk.  Shingles shot (for age 50 and up)  General health tests  Diabetes screening:  Starting at age 35, Get screened for diabetes at least every 3 years.  If you are younger than age 35, ask your care team if you should be screened for diabetes.  Cholesterol test: At age 39, start having a cholesterol test every 5 years, or more often if advised.  Bone density scan (DEXA): At age 50, ask your care team if you should have this scan for osteoporosis (brittle bones).  Hepatitis C: Get tested at least once in your life.  STIs (sexually  transmitted infections)  Before age 24: Ask your care team if you should be screened for STIs.  After age 24: Get screened for STIs if you're at risk. You are at risk for STIs (including HIV) if:  You are sexually active with more than one person.  You don't use condoms every time.  You or a partner was diagnosed with a sexually transmitted infection.  If you are at risk for HIV, ask about PrEP medicine to prevent HIV.  Get tested for HIV at least once in your life, whether you are at risk for HIV or not.  Cancer screening tests  Cervical cancer screening: If you have a cervix, begin getting regular cervical cancer screening tests starting at age 21.  Breast cancer scan (mammogram): If you've ever had breasts, begin having regular mammograms starting at age 40. This is a scan to check for breast cancer.  Colon cancer screening: It is important to start screening for colon cancer at age 45.  Have a colonoscopy test every 10 years (or more often if you're at risk) Or, ask your provider about stool tests like a FIT test every year or Cologuard test every 3 years.  To learn more about your testing options, visit:   .  For help making a decision, visit:   https://bit.ly/tj23215.  Prostate cancer screening test: If you have a prostate, ask your care team if a prostate cancer screening test (PSA) at age 55 is right for you.  Lung cancer screening: If you are a current or former smoker ages 50 to 80, ask your care team if ongoing lung cancer screenings are right for you.  For informational purposes only. Not to replace the advice of your health care provider. Copyright   2023 [facility]. All rights reserved. Clinically reviewed by the [facility]. [place] 233936 - REV 01/24.  Learning About Stress  What is stress?     Stress is your body's response to a hard situation. Your body can have a physical, emotional, or mental response. Stress is a fact of life for most people, and it  affects everyone differently. What causes stress for you may not be stressful for someone else.  A lot of things can cause stress. You may feel stress when you go on a job interview, take a test, or run a race. This kind of short-term stress is normal and even useful. It can help you if you need to work hard or react quickly. For example, stress can help you finish an important job on time.  Long-term stress is caused by ongoing stressful situations or events. Examples of long-term stress include long-term health problems, ongoing problems at work, or conflicts in your family. Long-term stress can harm your health.  How does stress affect your health?  When you are stressed, your body responds as though you are in danger. It makes hormones that speed up your heart, make you breathe faster, and give you a burst of energy. This is called the fight-or-flight stress response. If the stress is over quickly, your body goes back to normal and no harm is done.  But if stress happens too often or lasts too long, it can have bad effects. Long-term stress can make you more likely to get sick, and it can make symptoms of some diseases worse. If you tense up when you are stressed, you may develop neck, shoulder, or low back pain. Stress is linked to high blood pressure and heart disease.  Stress also harms your emotional health. It can make you alvarez, tense, or depressed. Your relationships may suffer, and you may not do well at work or school.  What can you do to manage stress?  You can try these things to help manage stress:   Do something active. Exercise or activity can help reduce stress. Walking is a great way to get started. Even everyday activities such as housecleaning or yard work can help.  Try yoga or courtney chi. These techniques combine exercise and meditation. You may need some training at first to learn them.  Do something you enjoy. For example, listen to music or go to a movie. Practice your hobby or do volunteer  "work.  Meditate. This can help you relax, because you are not worrying about what happened before or what may happen in the future.  Do guided imagery. Imagine yourself in any setting that helps you feel calm. You can use online videos, books, or a teacher to guide you.  Do breathing exercises. For example:  From a standing position, bend forward from the waist with your knees slightly bent. Let your arms dangle close to the floor.  Breathe in slowly and deeply as you return to a standing position. Roll up slowly and lift your head last.  Hold your breath for just a few seconds in the standing position.  Breathe out slowly and bend forward from the waist.  Let your feelings out. Talk, laugh, cry, and express anger when you need to. Talking with supportive friends or family, a counselor, or a jori leader about your feelings is a healthy way to relieve stress. Avoid discussing your feelings with people who make you feel worse.  Write. It may help to write about things that are bothering you. This helps you find out how much stress you feel and what is causing it. When you know this, you can find better ways to cope.  What can you do to prevent stress?  You might try some of these things to help prevent stress:  Manage your time. This helps you find time to do the things you want and need to do.  Get enough sleep. Your body recovers from the stresses of the day while you are sleeping.  Get support. Your family, friends, and community can make a difference in how you experience stress.  Limit your news feed. Avoid or limit time on social media or news that may make you feel stressed.  Do something active. Exercise or activity can help reduce stress. Walking is a great way to get started.  Where can you learn more?  Go to https://www.[employer].net/patiented  Enter N032 in the search box to learn more about \"Learning About Stress.\"  Current as of: October 24, 2024  Content Version: 14.5 2024-2025 [employer]   Care instructions adapted under license by your healthcare professional. If you have questions about a medical condition or this instruction, always ask your healthcare professional. [employer] disclaims any warranty or liability for your use of this information.    Recovering From Depression: Care Instructions  Overview    Sticking to your treatment plan is important as you recover from depression. It may take time for your symptoms to get better after you start treatment. Try not to give up if you don't feel better right away. Make sure you keep going to counseling and taking any prescribed medicine if they are part of your treatment plan.  Focus on things that can help you feel better, such as being with friends and family. Try to eat healthy foods, be active, and get enough sleep. Take things slowly as you begin to recover.  Follow-up care is a key part of your treatment and safety. Be sure to make and go to all appointments, and call your doctor if you are having problems. It's also a good idea to know your test results and keep a list of the medicines you take.  How can you care for yourself at home?  Be realistic  If you have a large task to do, break it up into smaller steps you can handle, and just do what you can.  You may want to put off important decisions until your depression has lifted. If you have plans that will have a major impact on your life, such as marriage, divorce, or a job change, try to wait a bit. Talk it over with friends and loved ones who can help you look at the overall picture first.  Reaching out to people for help is important. Do not isolate yourself. Let your family and friends help you. Find someone you can trust and confide in, and talk to that person.  Be patient, and be kind to yourself. Remember that depression is not your fault and is not something you can overcome with willpower alone. Treatment is important for depression, just like for any other  illness. Feeling better takes time, and your mood will improve little by little.  Stay active  Stay busy and get outside. Take a walk, or try some other light exercise.  Talk with your doctor about an exercise program. Exercise can help with mild depression.  Go to a movie or concert. Take part in a Jehovah's witness activity or other social gathering. Go to a ball game.  Ask a friend to have dinner with you.  Take care of yourself  Eat healthy foods such as fresh fruits and vegetables, whole grains, and lean protein. If you have lost your appetite, eat small snacks rather than large meals.  Avoid using marijuana and other drugs and drinking alcohol. Do not take medicines that have not been prescribed for you. They may interfere with medicines you may be taking for depression, or they may make your depression worse.  Take your medicines exactly as they are prescribed. You may start to feel better within 1 to 3 weeks of taking antidepressant medicine. But it can take as many as 6 to 8 weeks to see more improvement. If you have questions or concerns about your medicines, or if you do not notice any improvement by 3 weeks, talk to your doctor.  Continue to take your medicine after your symptoms improve. Taking your medicine for at least 6 months after you feel better can help keep you from getting depressed again. If this isn't the first time you have been depressed, your doctor may recommend you to take medicine even longer.  If you have any side effects from your medicine, tell your doctor. Many side effects are mild and will go away on their own after you have been taking the medicine for a few weeks. Some may last longer. Talk to your doctor if side effects are bothering you too much. You might be able to try a different medicine.  Continue counseling. It may help prevent depression from returning, especially if you've had multiple episodes of depression. Talk with your counselor if you are having a hard time attending your  sessions or you think the sessions aren't working. Don't just stop going.  Get enough sleep. Talk to your doctor if you are having problems sleeping.  Avoid sleeping pills unless they are prescribed by the doctor treating your depression. Sleeping pills may make you groggy during the day, and they may interact with other medicine you are taking.  If you have any other illnesses, such as diabetes, heart disease, or high blood pressure, make sure to continue with your treatment. Tell your doctor about all of the medicines you take, including those with or without a prescription.  Where to get help 24 hours a day, 7 days a week  If you or someone you know talks about suicide, self-harm, a mental health crisis, a substance use crisis, or any other kind of emotional distress, get help right away. You can:  Call the Suicide and Crisis Lifeline at [place].  Text HOME to 275497 to access the Crisis Text Line.  Consider saving these numbers in your phone.  Go to [employer] for more information or to chat online.  Call 061 anytime you think you may need emergency care. For example, call if:  You feel like hurting yourself or someone else.  Someone you know has depression and is about to attempt or is attempting suicide.  Where to get help 24 hours a day, 7 days a week  If you or someone you know talks about suicide, self-harm, a mental health crisis, a substance use crisis, or any other kind of emotional distress, get help right away. You can:  Call the Suicide and Crisis Lifeline at 557.  Text HOME to 921216 to access the Crisis Text Line.  Consider saving these numbers in your phone.  Go to [employer] for more information or to chat online.  Call your doctor now or seek immediate medical care if:  You hear voices.  Someone you know has depression and:  Starts to give away possessions.  Uses illegal drugs or drinks alcohol heavily.  Talks or writes about death, including writing suicide notes or talking about guns,  "knives, or pills.  Starts to spend a lot of time alone.  Acts very aggressively or suddenly appears calm.  Watch closely for changes in your health, and be sure to contact your doctor if:  You do not get better as expected.  Where can you learn more?  Go to https://www.[employer].net/patiented  Enter N529 in the search box to learn more about \"Recovering From Depression: Care Instructions.\"  Current as of: July 31, 2024  Content Version: 14.5    3870-2607 [employer].   Care instructions adapted under license by your healthcare professional. If you have questions about a medical condition or this instruction, always ask your healthcare professional. [employer] disclaims any warranty or liability for your use of this information.    Substance Use Disorder: Care Instructions  Overview     You can improve your life and health by stopping your use of alcohol or drugs. When you don't drink or use drugs, you may feel and sleep better. You may get along better with your family, friends, and coworkers. There are medicines and programs that can help with substance use disorder.  How can you care for yourself at home?  Here are some ways to help you stay sober and prevent relapse.  If you have been given medicine to help keep you sober or reduce your cravings, be sure to take it exactly as prescribed.  Talk to your doctor about programs that can help you stop using drugs or drinking alcohol.  Do not keep alcohol or drugs in your home.  Plan ahead. Think about what you'll say if other people ask you to drink or use drugs. Try not to spend time with people who drink or use drugs.  Use the time and money spent on drinking or drugs to do something that's important to you.  Preventing a relapse  Have a plan to deal with relapse. Learn to recognize changes in your thinking that lead you to drink or use drugs. Get help before you start to drink or use drugs again.  Try to stay away from situations, friends, or " places that may lead you to drink or use drugs.  If you feel the need to drink alcohol or use drugs again, seek help right away. Call a trusted friend or family member. Some people get support from organizations such as Narcotics Anonymous or [employer] or from treatment facilities.  If you relapse, get help as soon as you can. Some people make a plan with another person that outlines what they want that person to do for them if they relapse. The plan usually includes how to handle the relapse and who to notify in case of relapse.  Don't give up. Remember that a relapse doesn't mean that you have failed. Use the experience to learn the triggers that lead you to drink or use drugs. Then quit again. Recovery is a lifelong process. Many people have several relapses before they are able to quit for good.  Follow-up care is a key part of your treatment and safety. Be sure to make and go to all appointments, and call your doctor if you are having problems. It's also a good idea to know your test results and keep a list of the medicines you take.  When should you call for help?   Call [phone]  anytime you think you may need emergency care. For example, call if you or someone else:    Has overdosed or has withdrawal signs. Be sure to tell the emergency workers that you are or someone else is using or trying to quit using drugs. Overdose or withdrawal signs may include:  Losing consciousness.  Seizure.  Seeing or hearing things that aren't there (hallucinations).     Is thinking or talking about suicide or harming others.   Where to get help 24 hours a day, 7 days a week   If you or someone you know talks about suicide, self-harm, a mental health crisis, a substance use crisis, or any other kind of emotional distress, get help right away. You can:    Call the Suicide and Crisis Lifeline at 658.     Call 7-935-779-TALK (1-308.730.3996).     Text HOME to 359891 to access the Crisis Text Line.   Consider saving these numbers in  "your phone.  Go to [employer] for more information or to chat online.  Call your doctor now or seek immediate medical care if:    You are having withdrawal symptoms. These may include nausea or vomiting, sweating, shakiness, and anxiety.   Watch closely for changes in your health, and be sure to contact your doctor if:    You have a relapse.     You need more help or support to stop.   Where can you learn more?  Go to https://www.[employer]net/patiented  Enter H573 in the search box to learn more about \"Substance Use Disorder: Care Instructions.\"  Current as of: August 20, 2024  Content Version: 14.5    9358-6115 [employer].   Care instructions adapted under license by your healthcare professional. If you have questions about a medical condition or this instruction, always ask your healthcare professional. [employer] disclaims any warranty or liability for your use of this information.       "

## 2025-06-23 NOTE — PROGRESS NOTES
"Preventive Care Visit  [facility] RUBIA Oviedo PA-C, Family Medicine  Jun 23, 2025      Assessment & Plan     Routine general medical examination at a health care facility  Reviewed VS, weight/BMI   Routine screenings see orders  Immunizations: see orders and documentation in HPI. Discussed vaccines coverage as pharmacy benefit.  Health and cancer screening recommendations delivered according to the USPSTF and other appropriate society guidelines  Nutrition and exercise counseling performed.    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 About to start DBT. SI has been better.  - Urea Nitrogen (BUN)  - Creatinine  - Calcium  - UA reflex to Microscopic  - TSH  - T4 free    Patient has been advised of split billing requirements and indicates understanding: Yes        BMI  Estimated body mass index is 27.41 kg/m  as calculated from the following:    Height as of this encounter: 1.778 m (5' 10\").    Weight as of this encounter: 86.6 kg (191 lb).   Weight management plan: Discussed healthy diet and exercise guidelines    Depression Screening Follow Up        6/23/2025     1:11 PM   PHQ   PHQ-9 Total Score 10    Q9: Thoughts of better off dead/self-harm past 2 weeks Several days   F/U: Thoughts of suicide or self-harm Yes   F/U: Self harm-plan No   F/U: Self-harm action No   F/U: Safety concerns No       Patient-reported         Follow Up Actions Taken  Referred patient back to mental health provider    Discussed the following ways the patient can remain in a safe environment:  Reviewed preventive health counseling, as reflected in patient " "instructions       Regular exercise       Healthy diet/nutrition       Vision screening    Counseling  Appropriate preventive services were addressed with this patient via screening, questionnaire, or discussion as appropriate for fall prevention, nutrition, physical activity, Tobacco-use cessation, social engagement, weight loss and cognition.  Checklist reviewing preventive services available has been given to the patient.  Reviewed patient's diet, addressing concerns and/or questions.   He is at risk for lack of exercise and has been provided with information to increase physical activity for the benefit of his well-being.   He is at risk for psychosocial distress and has been provided with information to reduce risk.         Follow Up: see above. Additionally patient was instructed to contact clinic for worsening symptoms, non-improvement in time frame discussed, and for questions regarding treatment plan.   For virtual visits, the patient was advised to be seen for in person evaluation if symptoms or condition are worsening or non-improvement as expected.   Shannon Oviedo PA-C    Meghan Camacho is a 40 year old, presenting for the following:  Physical        6/23/2025     1:38 PM   Additional Questions   Roomed by BT   Accompanied by self         6/23/2025     1:38 PM   Patient Reported Additional Medications   Patient reports taking the following new medications ESCITALOPRAM 20MG TABLETS Similar          HPI  Routine Health Maintenance:  Immunizations: will do a covid vaccine today   Colonoscopy: age <44yo. No fam hx.   PSA screening: age <49yo. No fam hx.   Fasting: yes      Mental health- depression  Started meeting with therapist Oct 2024. Didn't want to go on medications. Then a few weeks ago, really down, \"ready to be done with life\". Did have suicide plan - suicide by hanging in the garage- looked at if my life insurance would pay out to my family. Went to [facility]. Met with someone " there. They started him on lexapro and has been seeing psychiatrist. She added lithium 150mg at bedtime to help with the SI. The SI - I am looking forward to things, no plan, no intent. Therapist wants him to do DBT.   Father passed a few years ago which was traumatic for me.   , 2 kids.   No alcohol.  THC/cannabis   Irrigation/sprinkler system work.   Exercise- biking- 2-3d per week.           12/2/2024    10:07 AM 1/13/2025     6:53 AM 6/23/2025     1:11 PM   PHQ   PHQ-9 Total Score 12  9  10    Q9: Thoughts of better off dead/self-harm past 2 weeks Several days Several days Several days   F/U: Thoughts of suicide or self-harm No No Yes   F/U: Self harm-plan   No   F/U: Self-harm action   No   F/U: Safety concerns No No No       Patient-reported       Advance Care Planning    Discussed advance care planning with patient; however, patient declined at this time.        6/23/2025   General Health   How would you rate your overall physical health? Good   Feel stress (tense, anxious, or unable to sleep) Very much   (!) STRESS CONCERN      6/23/2025   Nutrition   Three or more servings of calcium each day? (!) NO   Diet: Regular (no restrictions)   How many servings of fruit and vegetables per day? (!) 0-1   How many sweetened beverages each day? (!) 2         6/23/2025   Exercise   Days per week of moderate/strenous exercise 3 days         6/23/2025   Social Factors   Frequency of gathering with friends or relatives Once a week   Worry food won't last until get money to buy more No   Food not last or not have enough money for food? No   Do you have housing? (Housing is defined as stable permanent housing and does not include staying outside in a car, in a tent, in an abandoned building, in an overnight shelter, or couch-surfing.) Yes   Are you worried about losing your housing? No   Lack of transportation? No   Unable to get utilities (heat,electricity)? No         6/23/2025   Dental   Dentist two times every  year? Yes       Today's PHQ-9 Score:       6/23/2025     1:11 PM   PHQ-9 SCORE   PHQ-9 Total Score MyChart 10 (Moderate depression)   PHQ-9 Total Score 10        Patient-reported         6/23/2025   Substance Use   Alcohol more than 3/day or more than 7/wk No   Do you use any other substances recreationally? 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 No        Reviewed and updated as needed this visit by Provider                    History reviewed. No pertinent past medical history.  History reviewed. No pertinent surgical history.  Lab work is in process  Labs reviewed in EPIC  BP Readings from Last 3 Encounters:   06/23/25 132/80   05/30/25 (!) 159/107   01/13/25 137/89    Wt Readings from Last 3 Encounters:   06/23/25 86.6 kg (191 lb)   05/30/25 92.5 kg (204 lb)   01/13/25 89.4 kg (197 lb)                  Patient Active Problem List   Diagnosis    Family history of aortic aneurysm    Elevated BP without diagnosis of hypertension    Major depressive disorder, recurrent episode, moderate (H)     History reviewed. No pertinent surgical history.    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      lithium (ESKALITH) 150 MG capsule Take 150 mg by mouth at bedtime.       Allergies   Allergen Reactions    Penicillins          Review of Systems  Constitutional, HEENT, cardiovascular, pulmonary, gi and gu systems are negative, except as otherwise noted.     Objective    Exam  /80   Pulse 58   Temp 97  F (36.1  C) (Temporal)   Resp 16   Ht 1.778 m (5' 10\")   Wt 86.6 kg (191 lb)   SpO2 100%   BMI 27.41 kg/m     Estimated body mass index is 27.41 kg/m  as calculated from the following:    Height as of this encounter: 1.778 m (5' 10\").    Weight as of this encounter: 86.6 kg (191 lb).    Physical Exam  GENERAL: alert and no distress  EYES: Eyes grossly normal to inspection, PERRL and conjunctivae and sclerae normal  HENT: ear canals and TM's normal, nose and mouth without ulcers or lesions  NECK: no adenopathy, no asymmetry, masses, or scars  RESP: lungs clear to auscultation - no rales, rhonchi or wheezes  CV: regular rate and rhythm, normal S1 S2, no S3 or S4, no murmur, click or rub, no peripheral edema  ABDOMEN: soft, nontender, no hepatosplenomegaly, no masses and bowel sounds normal  MS: no gross musculoskeletal defects noted, no edema  SKIN: no suspicious lesions or rashes  NEURO: Normal strength and tone, mentation intact and speech normal  PSYCH: mentation appears normal, affect normal/bright  LYMPH: no cervical, supraclavicular, axillary, or inguinal adenopathy    Results for orders placed or performed in visit on 06/23/25   UA reflex to Microscopic     Status: Normal   Result Value Ref Range    Color Urine Yellow Colorless, Straw, Light Yellow, Yellow    Appearance Urine Clear Clear    Glucose " Urine Negative Negative mg/dL    Bilirubin Urine Negative Negative    Ketones Urine Negative Negative mg/dL    Specific Gravity Urine 1.015 1.003 - 1.035    Blood Urine Negative Negative    pH Urine 7.0 5.0 - 7.0    Protein Albumin Urine Negative Negative mg/dL    Urobilinogen Urine 0.2 0.2, 1.0 E.U./dL    Nitrite Urine Negative Negative    Leukocyte Esterase Urine Negative Negative    Narrative    Microscopic not indicated         Signed Electronically by: Shannon Oviedo PA-C    Answers submitted by the patient for this visit:  Patient Health Questionnaire (Submitted on 6/23/2025)  If you checked off any problems, how difficult have these problems made it for you to do your work, take care of things at home, or get along with other people?: Extremely difficult  PHQ9 TOTAL SCORE: 10

## 2025-06-25 ENCOUNTER — RESULTS FOLLOW-UP (OUTPATIENT)
Dept: FAMILY MEDICINE | Facility: CLINIC | Age: 40
End: 2025-06-25

## 2025-08-18 DIAGNOSIS — R45.851 SUICIDE IDEATION: ICD-10-CM

## 2025-08-18 DIAGNOSIS — F33.2 SEVERE RECURRENT MAJOR DEPRESSION WITHOUT PSYCHOTIC FEATURES (H): Primary | ICD-10-CM
